# Patient Record
Sex: FEMALE | URBAN - METROPOLITAN AREA
[De-identification: names, ages, dates, MRNs, and addresses within clinical notes are randomized per-mention and may not be internally consistent; named-entity substitution may affect disease eponyms.]

---

## 2021-01-12 ENCOUNTER — INPATIENT (INPATIENT)
Facility: HOSPITAL | Age: 86
LOS: 1 days | Discharge: HOME | DRG: 149 | End: 2021-01-14
Attending: INTERNAL MEDICINE | Admitting: INTERNAL MEDICINE
Payer: MEDICARE

## 2021-01-12 VITALS
SYSTOLIC BLOOD PRESSURE: 172 MMHG | WEIGHT: 119.93 LBS | OXYGEN SATURATION: 97 % | HEART RATE: 81 BPM | DIASTOLIC BLOOD PRESSURE: 84 MMHG | TEMPERATURE: 98 F | RESPIRATION RATE: 18 BRPM | HEIGHT: 60 IN

## 2021-01-12 DIAGNOSIS — R07.9 CHEST PAIN, UNSPECIFIED: ICD-10-CM

## 2021-01-12 DIAGNOSIS — R42 DIZZINESS AND GIDDINESS: ICD-10-CM

## 2021-01-12 DIAGNOSIS — R91.1 SOLITARY PULMONARY NODULE: ICD-10-CM

## 2021-01-12 DIAGNOSIS — E78.5 HYPERLIPIDEMIA, UNSPECIFIED: ICD-10-CM

## 2021-01-12 DIAGNOSIS — I10 ESSENTIAL (PRIMARY) HYPERTENSION: ICD-10-CM

## 2021-01-12 LAB
ALBUMIN SERPL ELPH-MCNC: 4.1 G/DL — SIGNIFICANT CHANGE UP (ref 3.3–5)
ALP SERPL-CCNC: 74 U/L — SIGNIFICANT CHANGE UP (ref 40–120)
ALT FLD-CCNC: 28 U/L — SIGNIFICANT CHANGE UP (ref 12–78)
ANION GAP SERPL CALC-SCNC: 5 MMOL/L — SIGNIFICANT CHANGE UP (ref 5–17)
AST SERPL-CCNC: 17 U/L — SIGNIFICANT CHANGE UP (ref 15–37)
BASOPHILS # BLD AUTO: 0.02 K/UL — SIGNIFICANT CHANGE UP (ref 0–0.2)
BASOPHILS NFR BLD AUTO: 0.4 % — SIGNIFICANT CHANGE UP (ref 0–2)
BILIRUB SERPL-MCNC: 0.6 MG/DL — SIGNIFICANT CHANGE UP (ref 0.2–1.2)
BUN SERPL-MCNC: 17 MG/DL — SIGNIFICANT CHANGE UP (ref 7–23)
CALCIUM SERPL-MCNC: 9.9 MG/DL — SIGNIFICANT CHANGE UP (ref 8.5–10.1)
CHLORIDE SERPL-SCNC: 103 MMOL/L — SIGNIFICANT CHANGE UP (ref 96–108)
CK SERPL-CCNC: 55 U/L — SIGNIFICANT CHANGE UP (ref 26–192)
CO2 SERPL-SCNC: 30 MMOL/L — SIGNIFICANT CHANGE UP (ref 22–31)
CREAT SERPL-MCNC: 0.87 MG/DL — SIGNIFICANT CHANGE UP (ref 0.5–1.3)
EOSINOPHIL # BLD AUTO: 0.06 K/UL — SIGNIFICANT CHANGE UP (ref 0–0.5)
EOSINOPHIL NFR BLD AUTO: 1.1 % — SIGNIFICANT CHANGE UP (ref 0–6)
GLUCOSE SERPL-MCNC: 114 MG/DL — HIGH (ref 70–99)
HCT VFR BLD CALC: 42.6 % — SIGNIFICANT CHANGE UP (ref 34.5–45)
HGB BLD-MCNC: 14.4 G/DL — SIGNIFICANT CHANGE UP (ref 11.5–15.5)
IMM GRANULOCYTES NFR BLD AUTO: 0.5 % — SIGNIFICANT CHANGE UP (ref 0–1.5)
LYMPHOCYTES # BLD AUTO: 1.46 K/UL — SIGNIFICANT CHANGE UP (ref 1–3.3)
LYMPHOCYTES # BLD AUTO: 26.6 % — SIGNIFICANT CHANGE UP (ref 13–44)
MCHC RBC-ENTMCNC: 28.5 PG — SIGNIFICANT CHANGE UP (ref 27–34)
MCHC RBC-ENTMCNC: 33.8 GM/DL — SIGNIFICANT CHANGE UP (ref 32–36)
MCV RBC AUTO: 84.2 FL — SIGNIFICANT CHANGE UP (ref 80–100)
MONOCYTES # BLD AUTO: 0.29 K/UL — SIGNIFICANT CHANGE UP (ref 0–0.9)
MONOCYTES NFR BLD AUTO: 5.3 % — SIGNIFICANT CHANGE UP (ref 2–14)
NEUTROPHILS # BLD AUTO: 3.62 K/UL — SIGNIFICANT CHANGE UP (ref 1.8–7.4)
NEUTROPHILS NFR BLD AUTO: 66.1 % — SIGNIFICANT CHANGE UP (ref 43–77)
NRBC # BLD: 0 /100 WBCS — SIGNIFICANT CHANGE UP (ref 0–0)
PLATELET # BLD AUTO: 225 K/UL — SIGNIFICANT CHANGE UP (ref 150–400)
POTASSIUM SERPL-MCNC: 5.3 MMOL/L — SIGNIFICANT CHANGE UP (ref 3.5–5.3)
POTASSIUM SERPL-SCNC: 5.3 MMOL/L — SIGNIFICANT CHANGE UP (ref 3.5–5.3)
PROCALCITONIN SERPL-MCNC: <0.05 — SIGNIFICANT CHANGE UP (ref 0–0.04)
PROT SERPL-MCNC: 8.2 G/DL — SIGNIFICANT CHANGE UP (ref 6–8.3)
RBC # BLD: 5.06 M/UL — SIGNIFICANT CHANGE UP (ref 3.8–5.2)
RBC # FLD: 13.1 % — SIGNIFICANT CHANGE UP (ref 10.3–14.5)
SARS-COV-2 RNA SPEC QL NAA+PROBE: SIGNIFICANT CHANGE UP
SODIUM SERPL-SCNC: 138 MMOL/L — SIGNIFICANT CHANGE UP (ref 135–145)
TROPONIN I SERPL-MCNC: <.015 NG/ML — SIGNIFICANT CHANGE UP (ref 0.01–0.04)
TROPONIN I SERPL-MCNC: <.015 NG/ML — SIGNIFICANT CHANGE UP (ref 0.01–0.04)
WBC # BLD: 5.48 K/UL — SIGNIFICANT CHANGE UP (ref 3.8–10.5)
WBC # FLD AUTO: 5.48 K/UL — SIGNIFICANT CHANGE UP (ref 3.8–10.5)

## 2021-01-12 PROCEDURE — 70498 CT ANGIOGRAPHY NECK: CPT | Mod: 26

## 2021-01-12 PROCEDURE — 99285 EMERGENCY DEPT VISIT HI MDM: CPT

## 2021-01-12 PROCEDURE — 71045 X-RAY EXAM CHEST 1 VIEW: CPT | Mod: 26

## 2021-01-12 PROCEDURE — 99222 1ST HOSP IP/OBS MODERATE 55: CPT

## 2021-01-12 PROCEDURE — 71250 CT THORAX DX C-: CPT | Mod: 26

## 2021-01-12 PROCEDURE — 70551 MRI BRAIN STEM W/O DYE: CPT | Mod: 26

## 2021-01-12 PROCEDURE — 70496 CT ANGIOGRAPHY HEAD: CPT | Mod: 26

## 2021-01-12 RX ORDER — MECLIZINE HCL 12.5 MG
25 TABLET ORAL THREE TIMES A DAY
Refills: 0 | Status: DISCONTINUED | OUTPATIENT
Start: 2021-01-12 | End: 2021-01-13

## 2021-01-12 RX ORDER — ENOXAPARIN SODIUM 100 MG/ML
40 INJECTION SUBCUTANEOUS DAILY
Refills: 0 | Status: DISCONTINUED | OUTPATIENT
Start: 2021-01-12 | End: 2021-01-14

## 2021-01-12 RX ORDER — ONDANSETRON 8 MG/1
4 TABLET, FILM COATED ORAL EVERY 8 HOURS
Refills: 0 | Status: DISCONTINUED | OUTPATIENT
Start: 2021-01-12 | End: 2021-01-14

## 2021-01-12 RX ORDER — ASPIRIN/CALCIUM CARB/MAGNESIUM 324 MG
81 TABLET ORAL DAILY
Refills: 0 | Status: DISCONTINUED | OUTPATIENT
Start: 2021-01-12 | End: 2021-01-14

## 2021-01-12 RX ORDER — SODIUM CHLORIDE 9 MG/ML
1000 INJECTION INTRAMUSCULAR; INTRAVENOUS; SUBCUTANEOUS ONCE
Refills: 0 | Status: COMPLETED | OUTPATIENT
Start: 2021-01-12 | End: 2021-01-12

## 2021-01-12 RX ORDER — LOSARTAN POTASSIUM 100 MG/1
50 TABLET, FILM COATED ORAL DAILY
Refills: 0 | Status: DISCONTINUED | OUTPATIENT
Start: 2021-01-12 | End: 2021-01-14

## 2021-01-12 RX ORDER — MECLIZINE HCL 12.5 MG
25 TABLET ORAL ONCE
Refills: 0 | Status: COMPLETED | OUTPATIENT
Start: 2021-01-12 | End: 2021-01-12

## 2021-01-12 RX ORDER — PANTOPRAZOLE SODIUM 20 MG/1
40 TABLET, DELAYED RELEASE ORAL
Refills: 0 | Status: DISCONTINUED | OUTPATIENT
Start: 2021-01-12 | End: 2021-01-14

## 2021-01-12 RX ORDER — ATORVASTATIN CALCIUM 80 MG/1
40 TABLET, FILM COATED ORAL AT BEDTIME
Refills: 0 | Status: DISCONTINUED | OUTPATIENT
Start: 2021-01-12 | End: 2021-01-14

## 2021-01-12 RX ORDER — METOCLOPRAMIDE HCL 10 MG
10 TABLET ORAL ONCE
Refills: 0 | Status: COMPLETED | OUTPATIENT
Start: 2021-01-12 | End: 2021-01-12

## 2021-01-12 RX ADMIN — Medication 10 MILLIGRAM(S): at 11:48

## 2021-01-12 RX ADMIN — SODIUM CHLORIDE 1000 MILLILITER(S): 9 INJECTION INTRAMUSCULAR; INTRAVENOUS; SUBCUTANEOUS at 11:48

## 2021-01-12 RX ADMIN — Medication 25 MILLIGRAM(S): at 11:48

## 2021-01-12 NOTE — ED PROVIDER NOTE - PROGRESS NOTE DETAILS
Dr. Adams spoke with Dr. Trinh. will see patient in ED feeling improved, but dizziness has not resolved. has been seen and evaluated by Dr. Pa. wants patient admitted for further eval. also would like cardio consult. spoke with Dr. De Los Santos, will consult. spoke with Dr. Ellis, accepts patient for admission

## 2021-01-12 NOTE — ED PROVIDER NOTE - OBJECTIVE STATEMENT
85 year old female with history of HTN, HLD, and gerd presents with dizziness and chest pain. had been having intermittent palpitations, chest pressure, and sharp left sided chest pain for the past 2-3 months. no shortness of breath. pain does not radiate. did not seek medical care because pain and symptoms mild. started to have dizziness a week ago (felt like room was spinning and like she was on a boat). symptoms lasted for 4 days, then went away, but then returned today and was worse in nature. hard to ambulate due to dizziness. history of similar symptoms in the past, but reports usually not this severe. 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 mild nausea, worse with movement and turning her head, no vomiting. no fevers, recent illnesses, abd pain, diarrhea, urinary complaints, or known covid exposures   PCP Cortez Bird (in [place])    [place] [employer] [id number]

## 2021-01-12 NOTE — ED PROVIDER NOTE - EYES, MLM
Clear bilaterally, pupils equal, round and reactive to light. EOMI. + horizontal nystagmus when looking to left

## 2021-01-12 NOTE — H&P ADULT - PROBLEM SELECTOR PLAN 2
Serial enzymes  ASA 81mg qd  Check ECHO  Cardio consult  Further work-up/management pending clinical course.

## 2021-01-12 NOTE — H&P ADULT - NSICDXPASTMEDICALHX_GEN_ALL_CORE_FT
PAST MEDICAL HISTORY:  GERD (gastroesophageal reflux disease)     HLD (hyperlipidemia)     HTN (hypertension)

## 2021-01-12 NOTE — ED PROVIDER NOTE - CLINICAL SUMMARY MEDICAL DECISION MAKING FREE TEXT BOX
intermittent chest pressure 2 months. dizziness past week (history of dizziness in past). hard time walking due to dizziness. differential include but not limited to BPV, central causes, posterior CVA, ACS, dehydration, electrolyte abnormality. will check labs, CT head, neuro consult. if continues to have unsteady gait after meds, CTA head and neck

## 2021-01-12 NOTE — ED ADULT NURSE NOTE - OBJECTIVE STATEMENT
Pt p/w dizziness x 1 week intermittently worse today, L sided chest pain intermittently x months w/ nausea. Per patient has history of vertigo w/ similar symptoms onset. Denies fall, head trauma. Dizziness worse w/ movement.

## 2021-01-12 NOTE — CONSULT NOTE ADULT - SUBJECTIVE AND OBJECTIVE BOX
Date/Time Patient Seen:  		  Referring MD:   Data Reviewed	       Patient is a 85y old  Female who presents with a chief complaint of Chest pain, Dizzy (12 Jan 2021 16:35)      Subjective/HPI  seen and examined  vs and meds reviewed  labs reviewed  H and P reviewed  er provider note reviewed  non smoker  poss 2nd hand smoke exposure  non drinker  family hx - htn    Reason for Admission: Chest pain, Dizzy  History of Present Illness:   85 year old female with history of HTN, HLD, and GERD presents with dizziness and chest pain. She has had been having intermittent palpitations, chest pressure, and sharp left sided chest pain for the past 2-3 months, worse over the past week. She denies shortness of breath. The pain does not radiate. She did not seek medical care because pain and symptoms were initially mild. She started to have dizziness a week ago (felt like room was spinning and like she was on a boat).The  symptoms lasted for 4 days, then went away, but then returned today and was worse in nature. It has been hard to ambulate due to dizziness. She has a history of similar symptoms in the past, but reports usually not this severe. 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 She has mild nausea, worse with movement and turning her head, no vomiting. no fevers, recent illnesses, abd pain, diarrhea, urinary complaints, or known covid exposures. Never smoker and no history of second hand smoke exposure.      	PCP Cortez Bird (in [place])      PAST MEDICAL & SURGICAL HISTORY:  GERD (gastroesophageal reflux disease)    HLD (hyperlipidemia)    HTN (hypertension)    PAST MEDICAL HISTORY:  GERD (gastroesophageal reflux disease)     HLD (hyperlipidemia)     HTN (hypertension).     Tobacco Screening:  · Core Measure Site	Yes  · Has the patient used tobacco in the past 30 days?	No    Risk Assessment:    Present on Admission:  Deep Venous Thrombosis	no  Pulmonary Embolus	no     Heart Failure:  Does this patient have a history of or has been diagnosed with heart failure? no.      Medication list         MEDICATIONS  (STANDING):  aspirin enteric coated 81 milliGRAM(s) Oral daily  atorvastatin 40 milliGRAM(s) Oral at bedtime  enoxaparin Injectable 40 milliGRAM(s) SubCutaneous daily  losartan 50 milliGRAM(s) Oral daily  meclizine 25 milliGRAM(s) Oral three times a day  pantoprazole    Tablet 40 milliGRAM(s) Oral before breakfast    MEDICATIONS  (PRN):  ondansetron Injectable 4 milliGRAM(s) IV Push every 8 hours PRN N/V         Vitals log        ICU Vital Signs Last 24 Hrs  T(C): 36.7 (12 Jan 2021 16:57), Max: 36.7 (12 Jan 2021 16:57)  T(F): 98 (12 Jan 2021 16:57), Max: 98 (12 Jan 2021 16:57)  HR: 90 (12 Jan 2021 16:57) (81 - 90)  BP: 160/83 (12 Jan 2021 16:57) (160/83 - 172/84)  BP(mean): --  ABP: --  ABP(mean): --  RR: 18 (12 Jan 2021 16:57) (18 - 18)  SpO2: 96% (12 Jan 2021 16:57) (96% - 97%)           Input and Output:  I&O's Detail      Lab Data                        14.4   5.48  )-----------( 225      ( 12 Jan 2021 11:56 )             42.6     01-12    138  |  103  |  17  ----------------------------<  114<H>  5.3   |  30  |  0.87    Ca    9.9      12 Jan 2021 11:56    TPro  8.2  /  Alb  4.1  /  TBili  0.6  /  DBili  x   /  AST  17  /  ALT  28  /  AlkPhos  74  01-12      CARDIAC MARKERS ( 12 Jan 2021 11:56 )  <.015 ng/mL / x     / x     / x     / x            Review of Systems	  dizziness  weakness      Objective     Physical Examination      heart s1s2  lung dec BS  abd soft  head nc  head at    Pertinent Lab findings & Imaging      [place]:  NO   Adequate UO     I&O's Detail           Discussed with:     Cultures:	        Radiology    EXAM:  MR BRAIN                            PROCEDURE DATE:  01/12/2021          INTERPRETATION:  CLINICAL STATEMENT: Pain.    TECHNIQUE: MRI of the brain was performed without gadolinium.    COMPARISON: CT head 1/12/2021    FINDINGS:  There is mild diffuse parenchymal volume loss. There are T2 prolongation signal abnormalities in the periventricular subcortical white matter likely related to mild chronic microvascular ischemic changes.    There is no acute parenchymal hemorrhage, parenchymal mass, mass effect or midline shift. There is no extra-axial fluid collection.  There is no hydrocephalus.  There is no acute infarct.    Mucosal thickening paranasal sinuses    IMPRESSION:  No acute intracranial hemorrhage or acute infarct.            JAZMYNE FANG MD; Attending Radiologist  This document has been electronically signed. Jan 12 2021  5:43PM          EXAM:  XR CHEST PORTABLE URGENT 1V                            PROCEDURE DATE:  01/12/2021          INTERPRETATION:  EXAM:XR CHEST URGENT.     CLINICAL INDICATION: Chest Pain .     TECHNIQUE: Portable AP view.     PRIOR EXAM: None.     FINDINGS:     Indeterminate nodular opacity overlying the left lung base. Rest of the visualized lung fields are unremarkable. Cardiac silhouette is magnified. No significant bony abnormality.      IMPRESSION:    Nodular opacity left lung base.              ANSLEY MELLO MD; Attending Radiologist  This document has been electronically signed. Jan 12 2021  3:03PM

## 2021-01-12 NOTE — CONSULT NOTE ADULT - PROBLEM SELECTOR RECOMMENDATION 9
85y old  Female who presents with a chief complaint of dizziness   HPI: 85 year old female with history of HTN, HLD, and GERD presents with dizziness and chest pain  cardio w/u in progress  labs and imaging reviewed  CNS imaging noted - MRI noted -   CXR and CT chest reviewed - pulm nodule - GGO - Sub Cm - non smoker  repeat study in 12 months as indicated - non emergent  no resp distress - on RA - VS noted  cvs rx regimen and BP control - monitor VS and HD and Sat  TTE planned  PT eval planned

## 2021-01-12 NOTE — ED PROVIDER NOTE - GASTROINTESTINAL NEGATIVE STATEMENT, MLM
no abdominal pain, no bloating, no constipation, no diarrhea, intermittent nausea (not currently) and no vomiting.

## 2021-01-12 NOTE — ED ADULT NURSE NOTE - NSIMPLEMENTINTERV_GEN_ALL_ED
Implemented All Fall with Harm Risk Interventions:  [place] to call system. Call bell, personal items and telephone within reach. Instruct patient to call for assistance. Room bathroom lighting operational. Non-slip footwear when patient is off stretcher. Physically safe environment: no spills, clutter or unnecessary equipment. Stretcher in lowest position, wheels locked, appropriate side rails in place. Provide visual cue, wrist band, yellow gown, etc. Monitor gait and stability. Monitor for mental status changes and reorient to person, place, and time. Review medications for side effects contributing to fall risk. Reinforce activity limits and safety measures with patient and family. Provide visual clues: red socks.

## 2021-01-12 NOTE — ED ADULT NURSE REASSESSMENT NOTE - JUGULAR VENOUS DISTENTION
-- DO NOT REPLY / DO NOT REPLY ALL --  -- Message is from the [facility]--    COVID-19 Universal Screening: Negative    General Patient Message      Reason for Call: Patient is calling stated she have shingles and will like to know how long she will be contagious. Asking for a call back from the office     Caller Information       Type Contact Phone    05/21/2020 01:09 PM Phone (Incoming) Benjamin Hdz Camryn E (Self) [phone] (M)          Alternative phone number: none    Turnaround time given to caller:   \"This message will be sent to [state Provider's name]. The clinical team will fulfill your request as soon as they review your message.\"     absent

## 2021-01-12 NOTE — H&P ADULT - HISTORY OF PRESENT ILLNESS
85 year old female with history of HTN, HLD, and GERD presents with dizziness and chest pain. She has had been having intermittent palpitations, chest pressure, and sharp left sided chest pain for the past 2-3 months, worse over the past week. She denies shortness of breath. The pain does not radiate. She did not seek medical care because pain and symptoms were initially mild. She started to have dizziness a week ago (felt like room was spinning and like she was on a boat).The  symptoms lasted for 4 days, then went away, but then returned today and was worse in nature. It has been hard to ambulate due to dizziness. She has a history of similar symptoms in the past, but reports usually not this severe. 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 She has mild nausea, worse with movement and turning her head, no vomiting. no fevers, recent illnesses, abd pain, diarrhea, urinary complaints, or known covid exposures. Never smoker and no history of second hand smoke exposure.  	PCP Cortez Bird (in [place])

## 2021-01-12 NOTE — ED PROVIDER NOTE - ATTENDING CONTRIBUTION TO CARE
84 yo F p/w came in for vertigo today - episodes  - diff ambulating since this am due to the vertigo - originally on / off - now more constant today. No abd pain. Some nausea. no v/d. Pt with intermittent chest pain recently. No neck / back pain. no abd pain. No recent travel. no sick contacts. No agg/alev factors. no other inj or co. no  known covid exposure  exam: MM Moist. neck supple. no meningeal signs. nl resp effort. no acc muscle use. abd soft NT. no hsm. no cvat. non-toxic, well appearing. no other acute findings.  Check labs, CT / CTA, neuro, cardio, ro covid, possible admission.

## 2021-01-12 NOTE — ED PROVIDER NOTE - CARE PLAN
Principal Discharge DX:	Dizziness  Secondary Diagnosis:	Chest pain  Secondary Diagnosis:	HLD (hyperlipidemia)  Secondary Diagnosis:	HTN (hypertension)

## 2021-01-12 NOTE — ED ADULT NURSE REASSESSMENT NOTE - COMFORT CARE
plan of care explained/side rails up/wait time explained/warm blanket provided
darkened lights/meal provided/plan of care explained/po fluids offered/side rails up/wait time explained/warm blanket provided

## 2021-01-12 NOTE — CONSULT NOTE ADULT - SUBJECTIVE AND OBJECTIVE BOX
DOCUMENTATION IN PROGRESS   [facility] - Javier Colon Grossman, [place], Magali, Marium Webster Savella  Office Number: [phone]    Initial Consult Note  CHIEF COMPLAINT: Patient is a 85y old  Female who presents with a chief complaint of dizziness   HPI: 85 year old female with history of HTN, HLD, and GERD presents with dizziness and chest pain. Patient had been having intermittent palpitations, chest pressure, and sharp left sided chest pain for the past 2-3 months. Denies shortness of breath, denies radiating pain. Patient  did not seek medical care because pain and symptoms mild. She started to have dizziness a week ago (felt like room was spinning and like she was on a boat). symptoms lasted for 4 days, then went away, but then returned today and was worse in nature, making it difficult to ambulate due to dizziness. Patient does have history of similar symptoms in the past, but not this severe. 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 She does have mild nausea, worse with movement and turning her head, no vomiting.     In ED, T(F): 97.6, HR: 81, BP: 172/84, RR: 18, SpO2: 97%. Labs remarkable for WBC 5.48, Hb 14.4,HCT 42.6, Platelet 225 Na 138, K 5.3, BUN 17, Creat 0.87, AST 17, ALT 28, Alk phos 74, Trop I <.015. EKG showed SR, no acute ischemic changes and CXR clear. Seen by neurology, started on Antivert for vertigo. MRI head pending.     Allergies  No Known Allergies    PAST MEDICAL & SURGICAL HISTORY:  GERD (gastroesophageal reflux disease)  HLD (hyperlipidemia)  HTN (hypertension)    MEDICATIONS  (STANDING):  meclizine 25 milliGRAM(s) Oral three times a day    MEDICATIONS  (PRN):  ondansetron Injectable 4 milliGRAM(s) IV Push every 8 hours PRN N/V    FAMILY HISTORY:    SOCIAL HISTORY  Marital Status:   Occupation:   Lives with:     SUBSTANCE USE  Tobacco Usage:  ( ) None ( ) never smoked   ( ) former smoker  ( ) current smoker; Packs per day:   Alcohol Usage: ( ) none  ( ) occasional ( ) 2-3 times a week ( ) daily; Last drink:   Recreational drugs ( ) None    REVIEW OF SYSTEMS   CONSTITUTIONAL: No fevers, No chills, No fatigue, No weight gain  EYES: No vision changes, No vertigo, No throat pain   ENT: No congestion, No ear pain, No sore throat.  NECK: No pain, No stiffness  RESPIRATORY: No shortness of breath, No cough, No wheezing, No hemoptysis  CARDIOVASCULAR: No chest pain. No palpitations, No EARLY, No orthopnea, No PND, No pleuritic pain  GASTROINTESTINAL: No abdominal pain, No nausea, No vomiting, No hematemesis, No diarrhea No constipation. No melena  GENITOURINARY: No dysuria, No frequency, No incontinence, No hematuria  NEUROLOGICAL: No dizziness, No lightheadedness, No syncope, No LOC, No headache, No numbness, No weakness  MUSCULOSKELETAL: No joint pain, No joint swelling.  PSYCHIATRIC: No anxiety, No depression  DERMATOLOGY: No diaphoresis. No itching, No rashes, No pressure ulcers  HEME/LYMPH: No easy bruising, or bleeding gums  All other review of systems is negative unless indicated above.    VITAL SIGNS:   Vital Signs Last 24 Hrs  T(C): 36.4 (12 Jan 2021 10:59), Max: 36.4 (12 Jan 2021 10:59)  T(F): 97.6 (12 Jan 2021 10:59), Max: 97.6 (12 Jan 2021 10:59)  HR: 81 (12 Jan 2021 10:59) (81 - 81)  BP: 172/84 (12 Jan 2021 10:59) (172/84 - 172/84)  BP(mean): --  RR: 18 (12 Jan 2021 10:59) (18 - 18)  SpO2: 97% (12 Jan 2021 10:59) (97% - 97%)    Physical Exam:  Appearance: NAD, no distress, alert,   HEENT: Moist Mucous Membranes, Anicteric  Cardiovascular: Regular rate and rhythm, Normal S1 S2, No JVD, No murmurs, No rubs, gallops or clicks  Respiratory: Lungs clear to auscultation. No rales, No rhonchi, No wheezing. No tenderness to palpation  Gastrointestinal:  Soft, Non-tender, + BS  Neurologic: Non-focal  Skin: Warm and dry, No rashes, No ecchymosis, No cyanosis, No ulcers   Musculoskeletal: No clubbing, No cyanosis  Vascular: Peripheral pulses palpable 2+ bilaterally  Psychiatry: Mood & affect appropriate  Lymph: No peripheral edema.     LABS: All Labs Reviewed:                        14.4   5.48  )-----------( 225      ( 12 Jan 2021 11:56 )             42.6     12 Jan 2021 11:56    138    |  103    |  17     ----------------------------<  114    5.3     |  30     |  0.87     Ca    9.9        12 Jan 2021 11:56    TPro  8.2    /  Alb  4.1    /  TBili  0.6    /  DBili  x      /  AST  17     /  ALT  28     /  AlkPhos  74     12 Jan 2021 11:56  Troponin I, Serum: <.015 ng/mL (01-12-21 @ 11:56)    12 Lead ECG:   Ventricular Rate 79 BPM  Atrial Rate 79 BPM  P-R Interval 170 ms  QRS Duration 84 ms  Q-T Interval 388 ms  QTC Calculation(Bazett) 444 ms  P Axis 58 degrees  R Axis -34 degrees  T Axis 51 degrees  Diagnosis Line Normal sinus rhythm  Left axis deviation  T wave abnormality, consider anterolateral ischemia  Confirmed by JOSE ARMANDO KEYS ([id number]) on 1/12/2021 2:04:32 PM (01-12-21 @ 11:16)    < from: CT Angio Head w/ IV Cont (01.12.21 @ 14:10) >  CTA Head:  Calcified atherosclerotic plaques noted in the cavernous segments of bilateral internal carotid arteries resulting in narrowing on the left. There is narrowing at the right carotid siphon due to calcified atherosclerotic plaques.  The proximal anterior, middle and posterior cerebral arteries are patent without hemodynamically significant stenosis. Hypoplastic A1 segment right anterior cerebral artery.  The intracranial vertebral and basilar arteries are patent. Stenosis noted in bilateral C4 segments of distal vertebral arteries, right greater than left.  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    CT Head:  Mild diffuse parenchymal volume loss. Mild hypodensity periventricular white matter likely related to mild chronic microvascular ischemic changes  There isno acute intracranial hemorrhage, parenchymal mass, mass effect or midline shift. There is no acute territorial infarct. There is no hydrocephalus.  The cranium is intact. The visualized paranasal sinuses are well-aerated.  Postsurgical changes right mastoid air cells    IMPRESSION:  No acute intracranial hemorrhage or acute territorial infarct. If symptoms persist, follow-up MRI exam recommended  Mild (less than 50%) focal stenosis at origin of left internal carotid artery.  Stenosis at origin of bilateral vertebral arteries.  < end of copied text >    < from: Xray Chest 1 View- PORTABLE-Urgent (01.12.21 @ 12:03) >  IMPRESSION:  Nodular opacity left lung base.  < end of copied text > [facility] - Javier Colon, Anton Richard, Magali, Darin, Michael Causey  Office Number: [phone]    Initial Consult Note  CHIEF COMPLAINT: Patient is a 85y old  Female who presents with a chief complaint of dizziness   HPI: 85 year old female with history of HTN, HLD, and GERD presents with dizziness and chest pain. Patient had been having intermittent palpitations, chest pressure, and sharp left sided chest pain for the past 2-3 months. Denies shortness of breath, denies radiating pain. Patient  did not seek medical care because pain and symptoms mild. She started to have dizziness a week ago (felt like room was spinning and like she was on a boat). symptoms lasted for 4 days, then went away, but then returned today and was worse in nature, making it difficult to ambulate due to dizziness. Patient does have history of similar symptoms in the past, but not this severe. 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 She does have mild nausea, worse with movement and turning her head, no vomiting.     In ED, T(F): 97.6, HR: 81, BP: 172/84, RR: 18, SpO2: 97%. Labs remarkable for WBC 5.48, Hb 14.4,HCT 42.6, Platelet 225 Na 138, K 5.3, BUN 17, Creat 0.87, AST 17, ALT 28, Alk phos 74, Trop I <.015. EKG showed SR, no acute ischemic changes and CXR clear. Seen by neurology, started on Antivert for vertigo. MRI head pending. Patient seen and examined. Patient awake, alert, resting in bed. Tolerating room air.     Allergies  No Known Allergies    PAST MEDICAL & SURGICAL HISTORY:  GERD (gastroesophageal reflux disease)  HLD (hyperlipidemia)  HTN (hypertension)    MEDICATIONS  (STANDING):  meclizine 25 milliGRAM(s) Oral three times a day    MEDICATIONS  (PRN):  ondansetron Injectable 4 milliGRAM(s) IV Push every 8 hours PRN N/V    FAMILY HISTORY:    SOCIAL HISTORY  Marital Status:   Occupation:   Lives with: family     SUBSTANCE USE  Tobacco Usage:  ( x) None ( ) never smoked   ( ) former smoker  ( ) current smoker; Packs per day:   Alcohol Usage: ( x) none  ( ) occasional ( ) 2-3 times a week ( ) daily; Last drink:   Recreational drugs ( x) None    REVIEW OF SYSTEMS   CONSTITUTIONAL: No fevers, No chills, No fatigue, No weight gain  EYES: No vision changes, No vertigo, No throat pain   ENT: No congestion, No ear pain, No sore throat.  NECK: No pain, No stiffness  RESPIRATORY: No shortness of breath, No cough, No wheezing, No hemoptysis  CARDIOVASCULAR: + chest pain. No palpitations, No EARLY, No orthopnea, No PND, No pleuritic pain  GASTROINTESTINAL: No abdominal pain, + nausea, No vomiting, No hematemesis, No diarrhea No constipation. No melena  GENITOURINARY: No dysuria, No frequency, No incontinence, No hematuria  NEUROLOGICAL: + dizziness, No lightheadedness, No syncope, No LOC, No headache, No numbness, No weakness  MUSCULOSKELETAL: No joint pain, No joint swelling.  PSYCHIATRIC: No anxiety, No depression  DERMATOLOGY: No diaphoresis. No itching, No rashes, No pressure ulcers  HEME/LYMPH: No easy bruising, or bleeding gums  All other review of systems is negative unless indicated above.    VITAL SIGNS:   Vital Signs Last 24 Hrs  T(C): 36.4 (12 Jan 2021 10:59), Max: 36.4 (12 Jan 2021 10:59)  T(F): 97.6 (12 Jan 2021 10:59), Max: 97.6 (12 Jan 2021 10:59)  HR: 81 (12 Jan 2021 10:59) (81 - 81)  BP: 172/84 (12 Jan 2021 10:59) (172/84 - 172/84)  BP(mean): --  RR: 18 (12 Jan 2021 10:59) (18 - 18)  SpO2: 97% (12 Jan 2021 10:59) (97% - 97%)    Physical Exam:  Appearance: NAD, no distress, alert, Well developed   HEENT: Moist Mucous Membranes, Anicteric  Cardiovascular: Regular rate and rhythm, Normal S1 S2, No JVD, No murmurs, No rubs, gallops or clicks  Respiratory: Lungs clear to auscultation. No rales, No rhonchi, No wheezing. No tenderness to palpation  Gastrointestinal:  Soft, Non-tender, + BS  Neurologic: Non-focal  Skin: Warm and dry, No rashes, No ecchymosis, No cyanosis, No ulcers   Musculoskeletal: No clubbing, No cyanosis  Vascular: Peripheral pulses palpable 2+ bilaterally  Psychiatry: Mood & affect appropriate  Lymph: No peripheral edema.     LABS: All Labs Reviewed:                        14.4   5.48  )-----------( 225      ( 12 Jan 2021 11:56 )             42.6     12 Jan 2021 11:56    138    |  103    |  17     ----------------------------<  114    5.3     |  30     |  0.87     Ca    9.9        12 Jan 2021 11:56    TPro  8.2    /  Alb  4.1    /  TBili  0.6    /  DBili  x      /  AST  17     /  ALT  28     /  AlkPhos  74     12 Jan 2021 11:56  Troponin I, Serum: <.015 ng/mL (01-12-21 @ 11:56)    12 Lead ECG:   Ventricular Rate 79 BPM  Atrial Rate 79 BPM  P-R Interval 170 ms  QRS Duration 84 ms  Q-T Interval 388 ms  QTC Calculation(Bazett) 444 ms  P Axis 58 degrees  R Axis -34 degrees  T Axis 51 degrees  Diagnosis Line Normal sinus rhythm  Left axis deviation  T wave abnormality, consider anterolateral ischemia  Confirmed by JOSE ARMANDO KEYS ([id number]) on 1/12/2021 2:04:32 PM (01-12-21 @ 11:16)    < from: CT Angio Head w/ IV Cont (01.12.21 @ 14:10) >  CTA Head:  Calcified atherosclerotic plaques noted in the cavernous segments of bilateral internal carotid arteries resulting in narrowing on the left. There is narrowing at the right carotid siphon due to calcified atherosclerotic plaques.  The proximal anterior, middle and posterior cerebral arteries are patent without hemodynamically significant stenosis. Hypoplastic A1 segment right anterior cerebral artery.  The intracranial vertebral and basilar arteries are patent. Stenosis noted in bilateral C4 segments of distal vertebral arteries, right greater than left.  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    CT Head:  Mild diffuse parenchymal volume loss. Mild hypodensity periventricular white matter likely related to mild chronic microvascular ischemic changes  There isno acute intracranial hemorrhage, parenchymal mass, mass effect or midline shift. There is no acute territorial infarct. There is no hydrocephalus.  The cranium is intact. The visualized paranasal sinuses are well-aerated.  Postsurgical changes right mastoid air cells    IMPRESSION:  No acute intracranial hemorrhage or acute territorial infarct. If symptoms persist, follow-up MRI exam recommended  Mild (less than 50%) focal stenosis at origin of left internal carotid artery.  Stenosis at origin of bilateral vertebral arteries.  < end of copied text >    < from: Xray Chest 1 View- PORTABLE-Urgent (01.12.21 @ 12:03) >  IMPRESSION:  Nodular opacity left lung base.  < end of copied text >

## 2021-01-12 NOTE — H&P ADULT - PROBLEM SELECTOR PLAN 1
Admit to tele  Continue antivert  Neuro check q4h  MRI brain  PT  Neuro eval  Further work-up/management pending clinical course.

## 2021-01-12 NOTE — CONSULT NOTE ADULT - SUBJECTIVE AND OBJECTIVE BOX
vertigo and CP  vertigo mri head  Antivert  antiemetics  CP medical work up  aneurysmal NS eval and most probably out pt follow up

## 2021-01-12 NOTE — CONSULT NOTE ADULT - ASSESSMENT
85 year old female with history of HTN, HLD, and GERD presents with dizziness, chest pain and intermittent palpitations, found to have vertigo. Cardiology consulted to r/o ACS in the setting of chest pain     Chest Pain  - Patient presented with chest discomfort, going on for past 2-3 months with sharp pain with no radiation, no associated SOB, admits to intermittent palpitations and dizziness   - EKG showed SR, no ischemic changes noted   - Chest pain atypical in nature.   - Cardiac enzymes negative x 1Trend CE x 2.   - Patient hemodynamically stable  - Would consider out patient stress test when acute issues resolve.   - Continue Lipitor and losartan     Hypertension  - BP: 172/84 (01-12-21 @ 10:59) (172/84 - 172/84)  - Continue losartan   - Monitor routine hemodynamics     Hyperlipidemia  - Continue Lipitor 40 mg HS   - Low fat diet    Vertigo  - Continue Antivert  - Follow Neurology recommendations   - MRI head pending.     - Monitor and replete lytes, keep K>4, Mg>2.  - All other medical needs as per primary team.  - Other cardiovascular workup will depend on clinical course.  - Will continue to follow.    Michael Gabriel, MS FNP, [facility]  Nurse Practitioner- Cardiology   Spectra #[phone]   85 year old female with history of HTN, HLD, and GERD presents with dizziness, chest pain and intermittent palpitations, found to have vertigo. Cardiology consulted to r/o ACS in the setting of chest pain     Chest Pain  - Patient presented with chest discomfort, going on for past 2-3 months with sharp pain with no radiation, no associated SOB, admits to intermittent palpitations and dizziness   - EKG showed SR, TWI V1-V6.   - Chest pain atypical in nature.   - Cardiac enzymes negative x 1Trend CE x 2.   - Would consider out patient stress test when acute issues resolve.   - Continue Lipitor and losartan     Hypertension  - BP: 172/84 (01-12-21 @ 10:59) (172/84 - 172/84)  - Continue losartan   - Monitor routine hemodynamics     Hyperlipidemia  - Continue Lipitor 40 mg HS   - Low fat diet    Vertigo  - Continue Antivert  - Follow Neurology recommendations   - MRI head pending.     - Monitor and replete lytes, keep K>4, Mg>2.  - All other medical needs as per primary team.  - Other cardiovascular workup will depend on clinical course.  - Will continue to follow.    Michael Gabriel, MS FNP, AGACNP  Nurse Practitioner- Cardiology   Spectra #[phone]   85 year old female with history of HTN, HLD, and GERD presents with dizziness, chest pain and intermittent palpitations, found to have vertigo. Cardiology consulted to r/o ACS in the setting of chest pain     Chest Pain  - Patient presented with chest discomfort, going on for past 2-3 months with sharp pain with no radiation, no associated SOB, admits to intermittent palpitations and dizziness   - EKG showed SR, TWI V1-V6. No old EKG available to compare   - Chest pain atypical in nature.   - Cardiac enzymes negative x 1Trend CE x 2.   - Please obtain transthoracic echocardiogram to assess ventricular function and r/o valvular abnormalities   - Would consider out patient stress test when acute issues resolve.   - Continue Lipitor and losartan     Hypertension  - BP: 172/84 (01-12-21 @ 10:59) (172/84 - 172/84)  - Continue losartan   - Monitor routine hemodynamics     Hyperlipidemia  - Continue Lipitor 40 mg HS   - Low fat diet    Vertigo  - Continue Antivert  - Follow Neurology recommendations   - MRI head pending.     - Monitor and replete lytes, keep K>4, Mg>2.  - All other medical needs as per primary team.  - Other cardiovascular workup will depend on clinical course.  - Will continue to follow.    Michael Gabriel, MS FNP, [facility]  Nurse Practitioner- Cardiology   Spectra #[phone]   85 year old female with history of HTN, HLD, and GERD presents with dizziness, chest pain and intermittent palpitations, found to have vertigo. Cardiology consulted to r/o ACS in the setting of chest pain     Chest Pain  - Patient presented with chest discomfort, going on for past 2-3 months with sharp pain with no radiation, no associated SOB, admits to intermittent palpitations and dizziness   - EKG showed SR, TWI V1-V6. No old EKG available to compare   - Chest pain atypical in nature.   - Cardiac enzymes negative x 1Trend CE x 2.   - Please obtain transthoracic echocardiogram to assess ventricular function and r/o valvular abnormalities   - Would consider out patient stress test when acute issues resolve.   - Continue Lipitor and losartan     Hypertension  - BP: 172/84 (01-12-21 @ 10:59) (172/84 - 172/84)  - Continue losartan   - Monitor routine hemodynamics   - Add losartan if BP unable to be controlled    Hyperlipidemia  - Continue Lipitor 40 mg HS   - Low fat diet    Vertigo  - Continue Antivert  - Follow Neurology recommendations   - MRI head pending.     - Monitor and replete lytes, keep K>4, Mg>2.  - All other medical needs as per primary team.  - Other cardiovascular workup will depend on clinical course.  - Will continue to follow.    Michael Gabriel, MS FNP, [facility]  Nurse Practitioner- Cardiology   Spectra #[phone]

## 2021-01-12 NOTE — ED ADULT NURSE REASSESSMENT NOTE - NS ED NURSE REASSESS COMMENT FT1
Pt received from PRAKASH Joe. A+O x 4. German speaking only. Reports sizziness 5/10- no change in discomfort since arrival here to the ED. VSS., afebrile. covid pending. fall risk due to unsteady gait and loss of balance with dizziness. Normally independantly ambulatory at home without assistance. skin warm dry and intact. awaiting bed assignment. will continue to monitor.

## 2021-01-13 ENCOUNTER — TRANSCRIPTION ENCOUNTER (OUTPATIENT)
Age: 86
End: 2021-01-13

## 2021-01-13 LAB
A1C WITH ESTIMATED AVERAGE GLUCOSE RESULT: 5.9 % — HIGH (ref 4–5.6)
ANION GAP SERPL CALC-SCNC: 5 MMOL/L — SIGNIFICANT CHANGE UP (ref 5–17)
BUN SERPL-MCNC: 15 MG/DL — SIGNIFICANT CHANGE UP (ref 7–23)
CALCIUM SERPL-MCNC: 9.2 MG/DL — SIGNIFICANT CHANGE UP (ref 8.5–10.1)
CHLORIDE SERPL-SCNC: 104 MMOL/L — SIGNIFICANT CHANGE UP (ref 96–108)
CHOLEST SERPL-MCNC: 209 MG/DL — HIGH
CK SERPL-CCNC: 47 U/L — SIGNIFICANT CHANGE UP (ref 26–192)
CO2 SERPL-SCNC: 32 MMOL/L — HIGH (ref 22–31)
CREAT SERPL-MCNC: 0.89 MG/DL — SIGNIFICANT CHANGE UP (ref 0.5–1.3)
ESTIMATED AVERAGE GLUCOSE: 123 MG/DL — HIGH (ref 68–114)
GLUCOSE SERPL-MCNC: 99 MG/DL — SIGNIFICANT CHANGE UP (ref 70–99)
HCT VFR BLD CALC: 40 % — SIGNIFICANT CHANGE UP (ref 34.5–45)
HDLC SERPL-MCNC: 53 MG/DL — SIGNIFICANT CHANGE UP
HGB BLD-MCNC: 13.6 G/DL — SIGNIFICANT CHANGE UP (ref 11.5–15.5)
LIPID PNL WITH DIRECT LDL SERPL: 132 MG/DL — HIGH
MAGNESIUM SERPL-MCNC: 2.2 MG/DL — SIGNIFICANT CHANGE UP (ref 1.6–2.6)
MCHC RBC-ENTMCNC: 28.4 PG — SIGNIFICANT CHANGE UP (ref 27–34)
MCHC RBC-ENTMCNC: 34 GM/DL — SIGNIFICANT CHANGE UP (ref 32–36)
MCV RBC AUTO: 83.5 FL — SIGNIFICANT CHANGE UP (ref 80–100)
NON HDL CHOLESTEROL: 155 MG/DL — HIGH
NRBC # BLD: 0 /100 WBCS — SIGNIFICANT CHANGE UP (ref 0–0)
PLATELET # BLD AUTO: 231 K/UL — SIGNIFICANT CHANGE UP (ref 150–400)
POTASSIUM SERPL-MCNC: 3.5 MMOL/L — SIGNIFICANT CHANGE UP (ref 3.5–5.3)
POTASSIUM SERPL-SCNC: 3.5 MMOL/L — SIGNIFICANT CHANGE UP (ref 3.5–5.3)
RBC # BLD: 4.79 M/UL — SIGNIFICANT CHANGE UP (ref 3.8–5.2)
RBC # FLD: 13 % — SIGNIFICANT CHANGE UP (ref 10.3–14.5)
SARS-COV-2 IGG SERPL QL IA: NEGATIVE — SIGNIFICANT CHANGE UP
SARS-COV-2 IGM SERPL IA-ACNC: <0.1 INDEX — SIGNIFICANT CHANGE UP
SODIUM SERPL-SCNC: 141 MMOL/L — SIGNIFICANT CHANGE UP (ref 135–145)
TRIGL SERPL-MCNC: 118 MG/DL — SIGNIFICANT CHANGE UP
TROPONIN I SERPL-MCNC: <.015 NG/ML — SIGNIFICANT CHANGE UP (ref 0.01–0.04)
WBC # BLD: 6.46 K/UL — SIGNIFICANT CHANGE UP (ref 3.8–10.5)
WBC # FLD AUTO: 6.46 K/UL — SIGNIFICANT CHANGE UP (ref 3.8–10.5)

## 2021-01-13 PROCEDURE — 93306 TTE W/DOPPLER COMPLETE: CPT | Mod: 26

## 2021-01-13 PROCEDURE — 99232 SBSQ HOSP IP/OBS MODERATE 35: CPT

## 2021-01-13 PROCEDURE — 93010 ELECTROCARDIOGRAM REPORT: CPT

## 2021-01-13 RX ORDER — MECLIZINE HCL 12.5 MG
37.5 TABLET ORAL THREE TIMES A DAY
Refills: 0 | Status: DISCONTINUED | OUTPATIENT
Start: 2021-01-13 | End: 2021-01-14

## 2021-01-13 RX ADMIN — Medication 25 MILLIGRAM(S): at 00:09

## 2021-01-13 RX ADMIN — LOSARTAN POTASSIUM 50 MILLIGRAM(S): 100 TABLET, FILM COATED ORAL at 00:09

## 2021-01-13 RX ADMIN — Medication 37.5 MILLIGRAM(S): at 14:10

## 2021-01-13 RX ADMIN — PANTOPRAZOLE SODIUM 40 MILLIGRAM(S): 20 TABLET, DELAYED RELEASE ORAL at 06:02

## 2021-01-13 RX ADMIN — ENOXAPARIN SODIUM 40 MILLIGRAM(S): 100 INJECTION SUBCUTANEOUS at 12:24

## 2021-01-13 RX ADMIN — Medication 81 MILLIGRAM(S): at 12:24

## 2021-01-13 RX ADMIN — ATORVASTATIN CALCIUM 40 MILLIGRAM(S): 80 TABLET, FILM COATED ORAL at 22:15

## 2021-01-13 RX ADMIN — ATORVASTATIN CALCIUM 40 MILLIGRAM(S): 80 TABLET, FILM COATED ORAL at 00:09

## 2021-01-13 RX ADMIN — Medication 37.5 MILLIGRAM(S): at 22:17

## 2021-01-13 RX ADMIN — Medication 25 MILLIGRAM(S): at 06:02

## 2021-01-13 NOTE — DISCHARGE NOTE PROVIDER - PROVIDER TOKENS
PROVIDER:[TOKEN:[[id number]]] PROVIDER:[TOKEN:[[id number]]],PROVIDER:[TOKEN:[[id number]],FOLLOWUP:[2 weeks],ESTABLISHEDPATIENT:[T]],PROVIDER:[TOKEN:[[id number]],FOLLOWUP:[2 weeks]],PROVIDER:[TOKEN:[[id number]],FOLLOWUP:[1 week],ESTABLISHEDPATIENT:[T]]

## 2021-01-13 NOTE — PHYSICAL THERAPY INITIAL EVALUATION ADULT - PERTINENT HX OF CURRENT PROBLEM, REHAB EVAL
85 year old female with history of HTN, HLD, and GERD presents with dizziness and chest pain. She has had been having intermittent palpitations, chest pressure, and sharp left sided chest pain for the past 2-3 months, worse over the past week.

## 2021-01-13 NOTE — SWALLOW BEDSIDE ASSESSMENT ADULT - SLP PERTINENT HISTORY OF CURRENT PROBLEM
Per charting, 85 year old female with history of HTN, HLD, and GERD presents with dizziness and chest pain.

## 2021-01-13 NOTE — PROGRESS NOTE ADULT - ASSESSMENT
85 year old female with history of HTN, HLD, and GERD presents with dizziness, chest pain and intermittent palpitations, found to have vertigo. Cardiology consulted to r/o ACS in the setting of chest pain     Chest Pain  - atypical, x months, tender to touch   - EKG showed SR, TWI V1-V6. No old EKG available to compare   -ruled out for acs with serial negative enzymes    - echo results pending   - Would consider out patient stress test when acute issues resolve.   - Continue Lipitor and losartan     Hypertension  - BP: 105/61  - Continue losartan      Hyperlipidemia  - Continue Lipitor 40 mg HS   - Low fat diet    Vertigo  - Continue Antivert  - Follow Neurology recommendations   - MRI head  no acute abnormality     - Monitor and replete lytes, keep K>4, Mg>2.  - All other medical needs as per primary team.  - Other cardiovascular workup will depend on clinical course.  - Will continue to follow.  Jennifer Pineda FNP-C  Cardiology NP  [facility] [phone]   85 year old female with history of HTN, HLD, and GERD presents with dizziness, chest pain and intermittent palpitations, found to have vertigo. Cardiology consulted to r/o ACS in the setting of chest pain     Chest Pain  - atypical, x months, tender to touch   - EKG showed SR, TWI V1-V6.   -repeat ekg with diffuse TWI, repeat EKG daily   -ruled out for acs with serial negative enzymes    - echo results pending   -place on remote tele   - outpatient stress test when acute issues resolve.   - Continue Lipitor and losartan     Hypertension  - BP: 105/61  - Continue losartan      Hyperlipidemia  - Continue Lipitor 40 mg HS   - Low fat diet    Vertigo  - Continue Antivert  - Follow Neurology recommendations   - MRI head  no acute abnormality     - Monitor and replete lytes, keep K>4, Mg>2.  - All other medical needs as per primary team.  - Other cardiovascular workup will depend on clinical course.  - Will continue to follow.  Jennifer Pineda FNP-C  Cardiology NP  [phone]

## 2021-01-13 NOTE — PHYSICAL THERAPY INITIAL EVALUATION ADULT - GAIT TRAINING, PT EVAL
Pt will be able to independently ambulate 100 ft with no assistive device within 2 weeks to increase mobility at home. Pt will be able to independently ambulate 100 ft with appropriate assistive device within 2 weeks to increase mobility at home.

## 2021-01-13 NOTE — DISCHARGE NOTE PROVIDER - NSDCMRMEDTOKEN_GEN_ALL_CORE_FT
atorvastatin 40 mg oral tablet: 1 tab(s) orally once a day  chlorthalidone 25 mg oral tablet:   losartan 50 mg oral tablet:   meloxicam 15 mg oral tablet:   omeprazole 20 mg oral delayed release capsule:    atorvastatin 40 mg oral tablet: 1 tab(s) orally once a day  chlorthalidone 25 mg oral tablet:   losartan 50 mg oral tablet: 1 tab(s) orally once a day  meclizine 12.5 mg oral tablet: 3 tab(s) orally 3 times a day  meloxicam 15 mg oral tablet:   omeprazole 20 mg oral delayed release capsule:

## 2021-01-13 NOTE — DISCHARGE NOTE PROVIDER - CARE PROVIDER_API CALL
Pratima Rosa)  Neurology  [address] [place] [address]  Phone: [phone]  Fax: [phone]  Follow Up Time:    Pratima Rosa)  Neurology  [address] [place] [address]  Phone: [phone]  Fax: [phone]  Follow Up Time:     Osman Rodriguez)  Cardiovascular Disease; Internal Medicine  [address] [place], [place] [id number]  Phone: [phone]  Fax: [phone]  Established Patient  Follow Up Time: 2 weeks    Juan Ellis)  Gastroenterology  [address] [place] [address]  Phone: [phone]  Fax: [phone]  Follow Up Time: 2 weeks    [facility]  [address] [place] [address]  Phone: [phone]  Fax: [phone]  Established Patient  Follow Up Time: 1 week

## 2021-01-13 NOTE — PHYSICAL THERAPY INITIAL EVALUATION ADULT - ACTIVE RANGE OF MOTION EXAMINATION, REHAB EVAL
no Active ROM deficits were identified/bryanna. upper extremity Active ROM was WNL (within normal limits)/bilateral lower extremity Active ROM was WNL (within normal limits)

## 2021-01-13 NOTE — PHYSICAL THERAPY INITIAL EVALUATION ADULT - PLANNED THERAPY INTERVENTIONS, PT EVAL
bed mobility training/gait training/transfer training balance training/bed mobility training/gait training/transfer training

## 2021-01-13 NOTE — SWALLOW BEDSIDE ASSESSMENT ADULT - SWALLOW EVAL: PROGNOSIS
DIAGNOSIS CONTINUED: swallow s/p x1 trial of nectar thick liquids. This was NOT reduplicated upon several additional trials of single/small cup sips. Pt did not demonstrate overt s/sx of aspiration with thin liquids, puree, or solids. 4. Given concern for aspiration prior to admission and current swallow presentation, recommend MBS to objectively assess oropharyngeal swallow mechanism and determine least restrictive diet. Recommend PO downgrade to puree with nectar thick liquids pending MBS. Discussed with Dr. Ellis, who is in agreement with POC.

## 2021-01-13 NOTE — SWALLOW BEDSIDE ASSESSMENT ADULT - PHARYNGEAL PHASE
Delayed pharyngeal swallow throat clear x1, NOT reduplicated upon several additional trials/Delayed pharyngeal swallow

## 2021-01-13 NOTE — SWALLOW BEDSIDE ASSESSMENT ADULT - COMMENTS
Upon arrival, pt sleeping in bed. Pt arousable to voice and able to maintain adequate level of alertness for remainder of session. Pt on room air. Pt is primarily Setsw[place]-speaking, [place]  ID #[id number] utilized throughout session. Pt agreeable to assessment. Pt followed low level directives independently. Pt's vocal quality was clear with reduced vocal intensity. Pt denied pain pre and post assessment.  Per discussion with Dr. Ellis, pt's daughter expressed concern for aspiration prior to admission. Pt's daughter also reported pt has poor secretion management, specifically in the AM when first waking up.    MRI brain 1/12: "No acute intracranial hemorrhage or acute infarct"  CT chest 1/12: "Nonspecific 3 mm groundglass nodule right upper lobe" Pt's WBC is WFL, no fever.

## 2021-01-13 NOTE — DISCHARGE NOTE PROVIDER - HOSPITAL COURSE
85 year old female with history of HTN, HLD, and GERD presents with dizziness and chest pain. She has had been having intermittent palpitations, chest pressure, and sharp left sided chest pain for the past 2-3 months, worse over the past week. She denies shortness of breath. The pain does not radiate. She did not seek medical care because pain and symptoms were initially mild. She started to have dizziness a week ago (felt like room was spinning and like she was on a boat).The  symptoms lasted for 4 days, then went away, but then returned today and was worse in nature. It has been hard to ambulate due to dizziness. She has a history of similar symptoms in the past, but reports usually not this severe. 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 She has mild nausea, worse with movement and turning her head, no vomiting. no fevers, recent illnesses, abd pain, diarrhea, urinary complaints, or known covid exposures. Never smoker and no history of second hand smoke exposure.    CT/MRI Brain no acute pathology  ECHO normal  EKG had diffuse T wave inversions which were old. She ruled out for ACS  CT chest showed small nodule.  Needs repeat CT chest (for nodule) and brain (small aneurysm) in 6 months  SLP recommended puree with nectar thick liquids  Needs o/p GI f/u    >35 minutes spent on discharge

## 2021-01-13 NOTE — PROGRESS NOTE ADULT - PROBLEM SELECTOR PLAN 3
CT chest noted  Pulmonary consult noted CT chest noted -- will need repeat CT chest in 6-12 months  Daughter-in-law states that patient coughs up very thick mucous every morning x 1 month and worries that she might be aspirating (?)  Pulmonary consult noted  SLP evaluation  Further work-up/management pending clinical course.

## 2021-01-13 NOTE — PHYSICAL THERAPY INITIAL EVALUATION ADULT - TRANSFER TRAINING, PT EVAL
Pt will be able to independently transfer from all surfaces within 2 weeks to increase mobility at home.

## 2021-01-13 NOTE — DISCHARGE NOTE PROVIDER - NSDCCPCAREPLAN_GEN_ALL_CORE_FT
PRINCIPAL DISCHARGE DIAGNOSIS  Diagnosis: Dizziness  Assessment and Plan of Treatment: Continue antivert three times a day until dizziness resolves  Follow-up with your primary care doctor within 1 week.        SECONDARY DISCHARGE DIAGNOSES  Diagnosis: Difficulty swallowing  Assessment and Plan of Treatment: Soft diet with thickened liquids  Follow-up with gastroenterologist for further work-up    Diagnosis: Brain aneurysm  Assessment and Plan of Treatment: You'll need a repeat MRI Brain in 3-6 months   Follow-up with neurosurgery    Diagnosis: HTN (hypertension)  Assessment and Plan of Treatment: Continue current medications  Follow-up with your primary care doctor within 1 week.      Diagnosis: Lung nodule seen on imaging study  Assessment and Plan of Treatment: You'll need a repeat cat scan of the chest in 1 year  Follow-up with your primary care doctor within 1 week.      Diagnosis: Chest pain  Assessment and Plan of Treatment: Follow-up with cardiologist within 2 weeks to arrange for outpatient stress test

## 2021-01-13 NOTE — SWALLOW BEDSIDE ASSESSMENT ADULT - SWALLOW EVAL: DIAGNOSIS
Pt self-administered PO trials. Pt p/w top dentition, but reported her bottom dentition is at home. Pt reported difficulty masticating solid textures in the absence of dentures. Pt p/w 1. Moderate oral dysphagia when given soft solids marked by adequate retrieval and containment, prolonged mastication 2/2 incomplete dentition, delayed manipulation and transfer, and trace residue on lateral lingual surface (which cleared with additional dry swallow or liquid wash). 2. Functional oral phase when given puree, nectar thick liquids, and thin liquids marked by adequate retrieval and containment, timely manipulation and transfer, and adequate clearance post swallow. 3. Pharyngeal dysphagia across all PO. Swallow onset was suspected to be mildly delayed. There was laryngeal elevation to palpation. Pt exhibited x2-3 swallows per bolus with thin liquids, double swallow per bolus with nectar thick liquids, and single swallow per bolus with puree and solids. Pt demonstrated throat clear post

## 2021-01-13 NOTE — PHYSICAL THERAPY INITIAL EVALUATION ADULT - ADDITIONAL COMMENTS
Pt lives alone in an apartment with no stairs to enter. Pt ambulates independently with no assistive device prior to admission and was independent in all ADLs.

## 2021-01-14 ENCOUNTER — TRANSCRIPTION ENCOUNTER (OUTPATIENT)
Age: 86
End: 2021-01-14

## 2021-01-14 VITALS — SYSTOLIC BLOOD PRESSURE: 98 MMHG | OXYGEN SATURATION: 92 % | DIASTOLIC BLOOD PRESSURE: 56 MMHG

## 2021-01-14 PROCEDURE — 74230 X-RAY XM SWLNG FUNCJ C+: CPT | Mod: 26

## 2021-01-14 PROCEDURE — 99232 SBSQ HOSP IP/OBS MODERATE 35: CPT

## 2021-01-14 PROCEDURE — 93010 ELECTROCARDIOGRAM REPORT: CPT

## 2021-01-14 RX ORDER — LOSARTAN POTASSIUM 100 MG/1
1 TABLET, FILM COATED ORAL
Qty: 0 | Refills: 0 | DISCHARGE
Start: 2021-01-14

## 2021-01-14 RX ORDER — MECLIZINE HCL 12.5 MG
3 TABLET ORAL
Qty: 90 | Refills: 0
Start: 2021-01-14 | End: 2021-01-23

## 2021-01-14 RX ADMIN — PANTOPRAZOLE SODIUM 40 MILLIGRAM(S): 20 TABLET, DELAYED RELEASE ORAL at 05:42

## 2021-01-14 RX ADMIN — Medication 81 MILLIGRAM(S): at 12:02

## 2021-01-14 RX ADMIN — Medication 0.5 MILLIGRAM(S): at 12:01

## 2021-01-14 RX ADMIN — LOSARTAN POTASSIUM 50 MILLIGRAM(S): 100 TABLET, FILM COATED ORAL at 05:42

## 2021-01-14 RX ADMIN — ENOXAPARIN SODIUM 40 MILLIGRAM(S): 100 INJECTION SUBCUTANEOUS at 12:02

## 2021-01-14 RX ADMIN — Medication 37.5 MILLIGRAM(S): at 05:42

## 2021-01-14 RX ADMIN — Medication 37.5 MILLIGRAM(S): at 13:14

## 2021-01-14 NOTE — PROGRESS NOTE ADULT - REASON FOR ADMISSION
Chest pain, Dizzy

## 2021-01-14 NOTE — DISCHARGE NOTE NURSING/CASE MANAGEMENT/SOCIAL WORK - PATIENT PORTAL LINK FT
You can access the FollowMyHealth Patient Portal offered by [facility] by registering at the following website: [email or web address]. By joining [email or web address]’s FollowMyHealth portal, you will also be able to view your health information using other applications (apps) compatible with our system.

## 2021-01-14 NOTE — SWALLOW VFSS/MBS ASSESSMENT ADULT - RECOMMENDED FEEDING/EATING TECHNIQUES
alternate food with liquid/check mouth frequently for oral residue/pocketing/maintain upright posture during/after eating for 30 mins/oral hygiene/position upright (90 degrees)/provide rest periods between swallows/small sips/bites

## 2021-01-14 NOTE — SWALLOW VFSS/MBS ASSESSMENT ADULT - RECOMMENDED CONSISTENCY
1. Puree (dysphagia 1) with nectar thick liquids.  2. Aspiration/reflux precautions.  3. May advance solid consistency when upper/bottom dentures are present.  4. Safe swallowing guidelines: feed only when fully awake/alert, position pt upright 90 degrees, small bite/sip, alternate bite/sip, pace meal slowly, and remain upright 30 minutes post meal.  5. Consider swallowing therapy s/p d/c to maximize oropharyngeal swallow mechanism.  6. Therapeutic PO trials with SLP only of thin liquids via chin tuck maneuver.  7. Ongoing oral care. IMPRESSIONS CONTINUED: pharyngeal swallow trigger, adequate BOT retraction, adequate hyolaryngeal elevation/excursion, and complete epiglottic retroflexion. There was no penetration and/or aspiration pre/during/post swallow. There was adequate pharyngeal clearance post swallow.  6. Of note: there was trace retrograde to the level of the cervical esophagus across all PO, per radiologist report.    RECOMMENDATIONS:  1. Puree (dysphagia 1) with nectar thick liquids.  2. Aspiration/reflux precautions.  3. May advance solid consistency when upper/bottom dentures are present.  4. Safe swallowing guidelines: feed only when fully awake/alert, position pt upright 90 degrees, small bite/sip, alternate bite/sip, pace meal slowly, and remain upright 30 minutes post meal.  5. Consider swallowing therapy s/p d/c to maximize oropharyngeal swallow mechanism.  6. Therapeutic PO trials with SLP only of thin liquids via chin tuck maneuver.  7. Ongoing oral care.

## 2021-01-14 NOTE — SWALLOW VFSS/MBS ASSESSMENT ADULT - SLP GENERAL OBSERVATIONS
Pt received sitting upright in MBS chair, +lateral view. Pt awake and on room air. Pt is primarily Slovenian-speaking, [place]  utilized throughout entirety of study, ID #[id number]. Pt followed simple and multistep commands for purposes of exam/compensatory strategies. Pt denied pain pre and post assessment.

## 2021-01-14 NOTE — SWALLOW VFSS/MBS ASSESSMENT ADULT - ORAL PHASE
bolus hold/Delayed oral transit time/Reduced anterior - posterior transport bolus hold/Delayed oral transit time/Reduced anterior - posterior transport/Incomplete tongue to palate contact/Uncontrolled bolus / spillover in hypopharynx

## 2021-01-14 NOTE — PROGRESS NOTE ADULT - PROVIDER SPECIALTY LIST ADULT
Cardiology
Neurology
Neurology
Cardiology
Pulmonology
Pulmonology
Internal Medicine
Internal Medicine

## 2021-01-14 NOTE — SWALLOW VFSS/MBS ASSESSMENT ADULT - DIAGNOSTIC IMPRESSIONS
1. Moderate oral dysphagia when given regular solids marked by adequate retrieval and containment, prolonged mastication 2/2 absent bottom denture, prolonged bolus cohesion, brief bolus hold, reduced posterior transfer with delayed oral transit time, piecemeal swallow x2, and adequate clearance s/p second swallow.  2. Mild to moderate oral dysphagia when given puree, honey thick liquids, nectar thick liquids, and thin liquids marked by adequate retrieval and containment, reduced bolus cohesion across all liquids resulting in premature spillage to hypopharynx, reduced posterior transfer with delayed oral transit time. piecemeal swallow x2, and adequate clearance s/p second swallow.  3. Moderate to severe pharyngeal dysphagia when given thin liquids marked by brief delay in pharyngeal swallow trigger at the level of the pyriforms, adequate BOT retraction, reduced hyolaryngeal elevation/excursion, and incomplete epiglottic retroflexion. There was SILENT penetration during the swallow w/o complete retrieval. Chin tuck maneuver was successful in eliminating penetration. Pt maintained chin tuck posture for second swallow given piecemeal. Upon completion of chin tuck, pt reported dizziness was exacerbated. Given report of dizziness, suspect lack of carry over for functional use. Recommend therapeutic trials be completed with SLP only for chin tuck maneuver x2 swallows with thin liquids. There was adequate pharyngeal clearance post swallow.  4. Mild pharyngeal dysphagia when given honey thick liquids and nectar thick liquids marked by brief delay in pharyngeal swallow trigger (at the level of the valleculae with honey thick, at the level of the pyriforms with nectar thick), adequate BOT retraction, adequate hyolaryngeal elevation/excursion, and complete epiglottic retroflexion. There was no penetration and/or aspiration pre/during/post swallow. There was adequate pharyngeal clearance post swallow.  5. Functional pharyngeal phase when given puree and solids marked by timely

## 2021-01-14 NOTE — PROGRESS NOTE ADULT - PROBLEM SELECTOR PLAN 2
CP with diffuse TWI -- No events on tele  Serial enzymes negative -- ACS ruled  ASA 81mg qd  ECHO normal  Cardio f/u  Check old EKG
CP with diffuse TWI -- Monitor on tele  Daily EKGs  Serial enzymes negative -- ACS ruled  ASA 81mg qd  Check ECHO  Cardio f/u  Further work-up/management pending clinical course.

## 2021-01-14 NOTE — SWALLOW VFSS/MBS ASSESSMENT ADULT - COMMENTS
Clinical swallow assessment completed 1/13, at which time pt was recommended puree with nectar thick liquids. MBS was recommend to r/o aspiration.

## 2021-01-14 NOTE — PROGRESS NOTE ADULT - SUBJECTIVE AND OBJECTIVE BOX
[facility] -- Javier Colon, Hilary, Anton, Darin De Los Santos Savella  Office # [phone]      Follow Up:  chest pain    Subjective/Observations:   seen at bedside in no acute distress  chest is tender to touch     REVIEW OF SYSTEMS: All other review of systems is negative unless indicated above    PAST MEDICAL & SURGICAL HISTORY:  GERD (gastroesophageal reflux disease)    HLD (hyperlipidemia)    HTN (hypertension)        MEDICATIONS  (STANDING):  aspirin enteric coated 81 milliGRAM(s) Oral daily  atorvastatin 40 milliGRAM(s) Oral at bedtime  enoxaparin Injectable 40 milliGRAM(s) SubCutaneous daily  losartan 50 milliGRAM(s) Oral daily  meclizine 25 milliGRAM(s) Oral three times a day  pantoprazole    Tablet 40 milliGRAM(s) Oral before breakfast    MEDICATIONS  (PRN):  ondansetron Injectable 4 milliGRAM(s) IV Push every 8 hours PRN N/V      Allergies    No Known Allergies    Intolerances        Vital Signs Last 24 Hrs  T(C): 37 (13 Jan 2021 05:24), Max: 37 (13 Jan 2021 05:24)  T(F): 98.6 (13 Jan 2021 05:24), Max: 98.6 (13 Jan 2021 05:24)  HR: 70 (13 Jan 2021 05:24) (61 - 90)  BP: 105/61 (13 Jan 2021 05:24) (105/61 - 172/84)  BP(mean): --  RR: 16 (13 Jan 2021 05:24) (14 - 18)  SpO2: 92% (13 Jan 2021 05:24) (92% - 97%)    I&O's Summary    Weight (kg): 54.4 (01-12 @ 10:59)    PHYSICAL EXAM:  TELE: not on tele   Constitutional: NAD, awake and alert, well-developed  HEENT: Moist Mucous Membranes, Anicteric  Pulmonary: Non-labored, breath sounds are clear bilaterally, No wheezing, crackles or rhonchi  Cardiovascular: Regular, S1 and S2 nl, No murmurs, tender to touch   Gastrointestinal: Bowel Sounds present, soft, nontender.   Lymph: No lymphadenopathy. No peripheral edema.  Skin: No visible rashes or ulcers.  Psych:  Mood & affect appropriate    LABS: All Labs Reviewed:                        13.6   6.46  )-----------( 231      ( 13 Jan 2021 05:37 )             40.0                         14.4   5.48  )-----------( 225      ( 12 Jan 2021 11:56 )             42.6     13 Jan 2021 05:37    141    |  104    |  15     ----------------------------<  99     3.5     |  32     |  0.89   12 Jan 2021 11:56    138    |  103    |  17     ----------------------------<  114    5.3     |  30     |  0.87     Ca    9.2        13 Jan 2021 05:37  Ca    9.9        12 Jan 2021 11:56  Mg     2.2       13 Jan 2021 05:37    TPro  8.2    /  Alb  4.1    /  TBili  0.6    /  DBili  x      /  AST  17     /  ALT  28     /  AlkPhos  74     12 Jan 2021 11:56      CARDIAC MARKERS ( 13 Jan 2021 05:37 )  <.015 ng/mL / x     / 47 U/L / x     / x      CARDIAC MARKERS ( 12 Jan 2021 21:34 )  <.015 ng/mL / x     / 55 U/L / x     / x      CARDIAC MARKERS ( 12 Jan 2021 11:56 )  <.015 ng/mL / x     / x     / x     / x             ECG:  < from: 12 Lead ECG (01.12.21 @ 11:16) >    Ventricular Rate 79 BPM    Atrial Rate 79 BPM    P-R Interval 170 ms    QRS Duration 84 ms    Q-T Interval 388 ms    QTC Calculation(Bazett) 444 ms    P Axis 58 degrees    R Axis -34 degrees    T Axis 51 degrees    Diagnosis Line Normal sinus rhythm  Left axis deviation  T wave abnormality, consider anterolateral ischemia    < end of copied text >      Echo:      Radiology:        
Neurology follow up note    ALEKSEY [id number]      Interval History:    Patient feels less vertigo     MEDICATIONS    aspirin enteric coated 81 milliGRAM(s) Oral daily  atorvastatin 40 milliGRAM(s) Oral at bedtime  enoxaparin Injectable 40 milliGRAM(s) SubCutaneous daily  losartan 50 milliGRAM(s) Oral daily  meclizine 37.5 milliGRAM(s) Oral three times a day  ondansetron Injectable 4 milliGRAM(s) IV Push every 8 hours PRN  pantoprazole    Tablet 40 milliGRAM(s) Oral before breakfast      Allergies    No Known Allergies    Intolerances            Vital Signs Last 24 Hrs  T(C): 36.8 (14 Jan 2021 04:43), Max: 37.2 (13 Jan 2021 13:16)  T(F): 98.2 (14 Jan 2021 04:43), Max: 99 (13 Jan 2021 13:16)  HR: 67 (14 Jan 2021 04:43) (67 - 74)  BP: 117/64 (14 Jan 2021 04:43) (117/64 - 131/68)  BP(mean): --  RR: 17 (14 Jan 2021 04:43) (16 - 17)  SpO2: 92% (14 Jan 2021 04:43) (92% - 92%)      REVIEW OF SYSTEMS:  Constitutional:  No fever, chills, or night sweats.  Head:  Positive episode of vertigo with movement, better at rest.  Eyes:  No double vision or blurry vision.  Ears:  Hearing loss, right ear.  Neck:  No neck pain.  Cardiovascular:  Positive chest pain which brought her to the emergency room.  Abdomen:  No vomiting but feels nauseous.  Extremities/Neurological:  No numbness or tingling.  Musculoskeletal:  No joint pain.  Genitourinary:  No burning upon urination.    PHYSICAL EXAMINATION:  VITAL SIGNS:  Temperature 97.6, pulse 81, blood pressure 172/84, and respirations 18.  HEENT:  Head:  Normocephalic, atraumatic.  Eyes:  No scleral icterus.  Ears:  Decreased hearing on the right side, not new.  NECK:  Supple.  RESPIRATORY:  Good air entry bilaterally.  CARDIOVASCULAR:  S1 and S2 heard.  ABDOMEN:  Soft and nontender.  EXTREMITIES:  No clubbing or cyanosis were noted.      NEUROLOGIC:  The patient is awake and alert.  Extraocular movements were intact.  No nystagmus.  Upon turning the head side-to-side, the patient has severe vertigo.  Motor:  Bilateral upper and lower were 5/5.  Sensory:  Bilateral upper and lower are intact to light touch.  Gait:  Unable to ambulate the patient secondary to vertigo.                 LABS:  CBC Full  -  ( 13 Jan 2021 05:37 )  WBC Count : 6.46 K/uL  RBC Count : 4.79 M/uL  Hemoglobin : 13.6 g/dL  Hematocrit : 40.0 %  Platelet Count - Automated : 231 K/uL  Mean Cell Volume : 83.5 fl  Mean Cell Hemoglobin : 28.4 pg  Mean Cell Hemoglobin Concentration : 34.0 gm/dL  Auto Neutrophil # : x  Auto Lymphocyte # : x  Auto Monocyte # : x  Auto Eosinophil # : x  Auto Basophil # : x  Auto Neutrophil % : x  Auto Lymphocyte % : x  Auto Monocyte % : x  Auto Eosinophil % : x  Auto Basophil % : x      01-13    141  |  104  |  15  ----------------------------<  99  3.5   |  32<H>  |  0.89    Ca    9.2      13 Jan 2021 05:37  Mg     2.2     01-13    TPro  8.2  /  Alb  4.1  /  TBili  0.6  /  DBili  x   /  AST  17  /  ALT  28  /  AlkPhos  74  01-12    Hemoglobin A1C:     LIVER FUNCTIONS - ( 12 Jan 2021 11:56 )  Alb: 4.1 g/dL / Pro: 8.2 g/dL / ALK PHOS: 74 U/L / ALT: 28 U/L / AST: 17 U/L / GGT: x           Vitamin B12         RADIOLOGY      ANALYSIS AND PLAN:  An 85-year-old with an episode of vertigo.  For episode of vertigo, suspect secondary to inner ear dysfunction.  We will plan for an MRI imaging of the brain.  For episode of chest pain, Cardiology and medical workup as needed.  For high blood pressure, continue the patient on blood pressure medications.  For high cholesterol, continue the patient on statin.  For history of aneurysm, I will recommend Neurosurgery evaluation, will need repeat imaging in 3 to 6 months.  Fall precautions.  will give low dose ativan times once   will increase antivert to 37.5 tid  Spoke with the daughter-in-law, Kanchan, at [phone], she understands the reasoning and thought process. 1/14/2021    Greater than 45 minutes of time was spent with the patient, plan of care, reviewing data, speaking to the family and  50% of the visit was spent counseling and/or coordinating care with multidisciplinary healthcare   
Neurology follow up note    BYUNG [id number]      Interval History:    Patient with vertigo    MEDICATIONS    aspirin enteric coated 81 milliGRAM(s) Oral daily  atorvastatin 40 milliGRAM(s) Oral at bedtime  enoxaparin Injectable 40 milliGRAM(s) SubCutaneous daily  losartan 50 milliGRAM(s) Oral daily  meclizine 25 milliGRAM(s) Oral three times a day  ondansetron Injectable 4 milliGRAM(s) IV Push every 8 hours PRN  pantoprazole    Tablet 40 milliGRAM(s) Oral before breakfast      Allergies    No Known Allergies    Intolerances            Vital Signs Last 24 Hrs  T(C): 37 (13 Jan 2021 05:24), Max: 37 (13 Jan 2021 05:24)  T(F): 98.6 (13 Jan 2021 05:24), Max: 98.6 (13 Jan 2021 05:24)  HR: 70 (13 Jan 2021 05:24) (61 - 90)  BP: 105/61 (13 Jan 2021 05:24) (105/61 - 160/83)  BP(mean): --  RR: 16 (13 Jan 2021 05:24) (14 - 18)  SpO2: 92% (13 Jan 2021 05:24) (92% - 96%)      REVIEW OF SYSTEMS:  Constitutional:  No fever, chills, or night sweats.  Head:  Positive episode of vertigo with movement, better at rest.  Eyes:  No double vision or blurry vision.  Ears:  Hearing loss, right ear.  Neck:  No neck pain.  Cardiovascular:  Positive chest pain which brought her to the emergency room.  Abdomen:  No vomiting but feels nauseous.  Extremities/Neurological:  No numbness or tingling.  Musculoskeletal:  No joint pain.  Genitourinary:  No burning upon urination.    PHYSICAL EXAMINATION:  VITAL SIGNS:  Temperature 97.6, pulse 81, blood pressure 172/84, and respirations 18.  HEENT:  Head:  Normocephalic, atraumatic.  Eyes:  No scleral icterus.  Ears:  Decreased hearing on the right side, not new.  NECK:  Supple.  RESPIRATORY:  Good air entry bilaterally.  CARDIOVASCULAR:  S1 and S2 heard.  ABDOMEN:  Soft and nontender.  EXTREMITIES:  No clubbing or cyanosis were noted.      NEUROLOGIC:  The patient is awake and alert.  Extraocular movements were intact.  No nystagmus.  Upon turning the head side-to-side, the patient has severe vertigo.  Motor:  Bilateral upper and lower were 5/5.  Sensory:  Bilateral upper and lower are intact to light touch.  Gait:  Unable to ambulate the patient secondary to vertigo.              LABS:  CBC Full  -  ( 13 Jan 2021 05:37 )  WBC Count : 6.46 K/uL  RBC Count : 4.79 M/uL  Hemoglobin : 13.6 g/dL  Hematocrit : 40.0 %  Platelet Count - Automated : 231 K/uL  Mean Cell Volume : 83.5 fl  Mean Cell Hemoglobin : 28.4 pg  Mean Cell Hemoglobin Concentration : 34.0 gm/dL  Auto Neutrophil # : x  Auto Lymphocyte # : x  Auto Monocyte # : x  Auto Eosinophil # : x  Auto Basophil # : x  Auto Neutrophil % : x  Auto Lymphocyte % : x  Auto Monocyte % : x  Auto Eosinophil % : x  Auto Basophil % : x      01-13    141  |  104  |  15  ----------------------------<  99  3.5   |  32<H>  |  0.89    Ca    9.2      13 Jan 2021 05:37  Mg     2.2     01-13    TPro  8.2  /  Alb  4.1  /  TBili  0.6  /  DBili  x   /  AST  17  /  ALT  28  /  AlkPhos  74  01-12    Hemoglobin A1C:   Lipid Panel 01-13 @ 10:02  Total Cholesterol, Serum 209  LDL --  Triglycerides 118    LIVER FUNCTIONS - ( 12 Jan 2021 11:56 )  Alb: 4.1 g/dL / Pro: 8.2 g/dL / ALK PHOS: 74 U/L / ALT: 28 U/L / AST: 17 U/L / GGT: x           Vitamin B12         RADIOLOGY      ANALYSIS AND PLAN:  An 85-year-old with an episode of vertigo.  For episode of vertigo, suspect secondary to inner ear dysfunction.  We will plan for an MRI imaging of the brain.  For episode of chest pain, Cardiology and medical workup as needed.  For high blood pressure, continue the patient on blood pressure medications.  For high cholesterol, continue the patient on statin.  For history of aneurysm, I will recommend Neurosurgery evaluation, will need repeat imaging in 3 to 6 months.  Fall precautions.  will increase antivert to 37.5 tid  Advanced care directives were discussed with the family.  Spoke with the daughter-in-law, Kanchan, at [phone], she understands the reasoning and thought process. 1/13/2021    Greater than 45 minutes of time was spent with the patient, plan of care, reviewing data, speaking to the family and  50% of the visit was spent counseling and/or coordinating care with multidisciplinary healthcare team  
[facility] -- Javier Colon, Hilary, Anton, Darin De Los Santos Savella  Office # [phone]      Follow Up:    htn  Subjective/Observations:   No events overnight resting comfortably in bed.  No complaints of chest pain, dyspnea, or palpitations reported. No signs of orthopnea or PND.  denies any further episodes of chest pain, reports "little bit of dizziness"      REVIEW OF SYSTEMS: All other review of systems is negative unless indicated above    PAST MEDICAL & SURGICAL HISTORY:  GERD (gastroesophageal reflux disease)    HLD (hyperlipidemia)    HTN (hypertension)        MEDICATIONS  (STANDING):  aspirin enteric coated 81 milliGRAM(s) Oral daily  atorvastatin 40 milliGRAM(s) Oral at bedtime  enoxaparin Injectable 40 milliGRAM(s) SubCutaneous daily  losartan 50 milliGRAM(s) Oral daily  meclizine 37.5 milliGRAM(s) Oral three times a day  pantoprazole    Tablet 40 milliGRAM(s) Oral before breakfast    MEDICATIONS  (PRN):  ondansetron Injectable 4 milliGRAM(s) IV Push every 8 hours PRN N/V      Allergies    No Known Allergies    Intolerances        Vital Signs Last 24 Hrs  T(C): 36.8 (14 Jan 2021 04:43), Max: 37.2 (13 Jan 2021 13:16)  T(F): 98.2 (14 Jan 2021 04:43), Max: 99 (13 Jan 2021 13:16)  HR: 67 (14 Jan 2021 04:43) (67 - 74)  BP: 117/64 (14 Jan 2021 04:43) (117/64 - 131/68)  BP(mean): --  RR: 17 (14 Jan 2021 04:43) (16 - 17)  SpO2: 92% (14 Jan 2021 04:43) (92% - 92%)    I&O's Summary        PHYSICAL EXAM:  TELE: nsr   Constitutional: NAD, awake and alert, well-developed  HEENT: Moist Mucous Membranes, Anicteric  Pulmonary: Non-labored, breath sounds are clear bilaterally, No wheezing, crackles or rhonchi  Cardiovascular: Regular, S1 and S2 nl, No murmurs, rubs, gallops or clicks  Gastrointestinal: Bowel Sounds present, soft, nontender.   Lymph: No lymphadenopathy. No peripheral edema.  Skin: No visible rashes or ulcers.  Psych:  Mood & affect appropriate    LABS: All Labs Reviewed:                        13.6   6.46  )-----------( 231      ( 13 Jan 2021 05:37 )             40.0                         14.4   5.48  )-----------( 225      ( 12 Jan 2021 11:56 )             42.6     13 Jan 2021 05:37    141    |  104    |  15     ----------------------------<  99     3.5     |  32     |  0.89   12 Jan 2021 11:56    138    |  103    |  17     ----------------------------<  114    5.3     |  30     |  0.87     Ca    9.2        13 Jan 2021 05:37  Ca    9.9        12 Jan 2021 11:56  Mg     2.2       13 Jan 2021 05:37    TPro  8.2    /  Alb  4.1    /  TBili  0.6    /  DBili  x      /  AST  17     /  ALT  28     /  AlkPhos  74     12 Jan 2021 11:56      CARDIAC MARKERS ( 13 Jan 2021 05:37 )  <.015 ng/mL / x     / 47 U/L / x     / x      CARDIAC MARKERS ( 12 Jan 2021 21:34 )  <.015 ng/mL / x     / 55 U/L / x     / x      CARDIAC MARKERS ( 12 Jan 2021 11:56 )  <.015 ng/mL / x     / x     / x     / x        ECG:  < from: 12 Lead ECG (01.13.21 @ 10:14) >    Ventricular Rate 73 BPM    Atrial Rate 73 BPM    P-R Interval 168 ms    QRS Duration 88 ms    Q-T Interval 420 ms    QTC Calculation(Bazett) 462 ms    P Axis 60 degrees    R Axis -38 degrees    T Axis 115 degrees    Diagnosis Line Normal sinus rhythm  Left axis deviation  ST & T wave abnormality, consider anterolateral ischemia  Abnormal ECG  When compared with ECG of 12-JAN-2021 11:16,  T wave inversion more evident in Lateral leads    < end of copied text >    Echo:  < from: TTE Echo Complete w/o Contrast w/ Doppler (01.13.21 @ 09:27) >  OBSERVATIONS:  Mitral Valve: normal, trace physiologic MR.  Aortic Valve/Aorta: Sclerotic trileaflet aortic valve with normal opening. Trace AI  Tricuspid Valve: normal with trace TR.  Pulmonic Valve: Not well-visualized  Left Atrium: normal  Right Atrium: Not well-visualized  Left Ventricle: normal LV size and systolic function, estimated LVEF of 65%.  Right Ventricle: Grossly normal size and systolic function.  Pericardium/Pleura: normal, no significant pericardial effusion.  Pulmonary/RV Pressure: estimated PA systolic pressure of 7 mmHg assuming an RA pressure of 3 mmHg.  LV diastolic dysfunction is present    Conclusion:  Normal left ventricular internal dimensions and systolic function, estimated LVEF of 65%.  Grossly normal RV size and systolic function.  Sclerotic trileaflet aortic valve, trace AI.  Trace physiologic MR and TR.  No significant pericardial effusion.      < end of copied text >    Radiology:        
Date/Time Patient Seen:  		  Referring MD:   Data Reviewed	       Patient is a 85y old  Female who presents with a chief complaint of Chest pain, Dizzy (13 Jan 2021 12:26)      Subjective/HPI     PAST MEDICAL & SURGICAL HISTORY:  GERD (gastroesophageal reflux disease)    HLD (hyperlipidemia)    HTN (hypertension)          Medication list         MEDICATIONS  (STANDING):  aspirin enteric coated 81 milliGRAM(s) Oral daily  atorvastatin 40 milliGRAM(s) Oral at bedtime  enoxaparin Injectable 40 milliGRAM(s) SubCutaneous daily  losartan 50 milliGRAM(s) Oral daily  meclizine 37.5 milliGRAM(s) Oral three times a day  pantoprazole    Tablet 40 milliGRAM(s) Oral before breakfast    MEDICATIONS  (PRN):  ondansetron Injectable 4 milliGRAM(s) IV Push every 8 hours PRN N/V         Vitals log        ICU Vital Signs Last 24 Hrs  T(C): 36.8 (14 Jan 2021 04:43), Max: 37.2 (13 Jan 2021 13:16)  T(F): 98.2 (14 Jan 2021 04:43), Max: 99 (13 Jan 2021 13:16)  HR: 67 (14 Jan 2021 04:43) (67 - 74)  BP: 117/64 (14 Jan 2021 04:43) (117/64 - 131/68)  BP(mean): --  ABP: --  ABP(mean): --  RR: 17 (14 Jan 2021 04:43) (16 - 17)  SpO2: 92% (14 Jan 2021 04:43) (92% - 92%)           Input and Output:  I&O's Detail      Lab Data                        13.6   6.46  )-----------( 231      ( 13 Jan 2021 05:37 )             40.0     01-13    141  |  104  |  15  ----------------------------<  99  3.5   |  32<H>  |  0.89    Ca    9.2      13 Jan 2021 05:37  Mg     2.2     01-13    TPro  8.2  /  Alb  4.1  /  TBili  0.6  /  DBili  x   /  AST  17  /  ALT  28  /  AlkPhos  74  01-12      CARDIAC MARKERS ( 13 Jan 2021 05:37 )  <.015 ng/mL / x     / 47 U/L / x     / x      CARDIAC MARKERS ( 12 Jan 2021 21:34 )  <.015 ng/mL / x     / 55 U/L / x     / x      CARDIAC MARKERS ( 12 Jan 2021 11:56 )  <.015 ng/mL / x     / x     / x     / x            Review of Systems	      Objective     Physical Examination    heart s1s2  lung dec BS  abd soft  on RA    Pertinent Lab findings & Imaging      Bustillo:  NO   Adequate UO     I&O's Detail           Discussed with:     Cultures:	        Radiology                            
Date/Time Patient Seen:  		  Referring MD:   Data Reviewed	       Patient is a 85y old  Female who presents with a chief complaint of Chest pain, Dizzy (12 Jan 2021 18:00)      Subjective/HPI     PAST MEDICAL & SURGICAL HISTORY:  GERD (gastroesophageal reflux disease)    HLD (hyperlipidemia)    HTN (hypertension)          Medication list         MEDICATIONS  (STANDING):  aspirin enteric coated 81 milliGRAM(s) Oral daily  atorvastatin 40 milliGRAM(s) Oral at bedtime  enoxaparin Injectable 40 milliGRAM(s) SubCutaneous daily  losartan 50 milliGRAM(s) Oral daily  meclizine 25 milliGRAM(s) Oral three times a day  pantoprazole    Tablet 40 milliGRAM(s) Oral before breakfast    MEDICATIONS  (PRN):  ondansetron Injectable 4 milliGRAM(s) IV Push every 8 hours PRN N/V         Vitals log        ICU Vital Signs Last 24 Hrs  T(C): 37 (13 Jan 2021 05:24), Max: 37 (13 Jan 2021 05:24)  T(F): 98.6 (13 Jan 2021 05:24), Max: 98.6 (13 Jan 2021 05:24)  HR: 70 (13 Jan 2021 05:24) (61 - 90)  BP: 105/61 (13 Jan 2021 05:24) (105/61 - 172/84)  BP(mean): --  ABP: --  ABP(mean): --  RR: 16 (13 Jan 2021 05:24) (14 - 18)  SpO2: 92% (13 Jan 2021 05:24) (92% - 97%)           Input and Output:  I&O's Detail      Lab Data                        13.6   6.46  )-----------( 231      ( 13 Jan 2021 05:37 )             40.0     01-13    141  |  104  |  15  ----------------------------<  99  3.5   |  32<H>  |  0.89    Ca    9.2      13 Jan 2021 05:37  Mg     2.2     01-13    TPro  8.2  /  Alb  4.1  /  TBili  0.6  /  DBili  x   /  AST  17  /  ALT  28  /  AlkPhos  74  01-12      CARDIAC MARKERS ( 13 Jan 2021 05:37 )  <.015 ng/mL / x     / 47 U/L / x     / x      CARDIAC MARKERS ( 12 Jan 2021 21:34 )  <.015 ng/mL / x     / 55 U/L / x     / x      CARDIAC MARKERS ( 12 Jan 2021 11:56 )  <.015 ng/mL / x     / x     / x     / x            Review of Systems	      Objective     Physical Examination    heart s1s2  lung dec BS  abd soft      Pertinent Lab findings & Imaging      Bustillo:  NO   Adequate UO     I&O's Detail           Discussed with:     Cultures:	        Radiology                            
Patient is a 85y old  Female who presents with a chief complaint of Chest pain, Dizzy (13 Jan 2021 11:42)      INTERVAL HPI/OVERNIGHT EVENTS: Patient seen and examined. NAD. Still dizzy.    Vital Signs Last 24 Hrs  T(C): 37 (13 Jan 2021 05:24), Max: 37 (13 Jan 2021 05:24)  T(F): 98.6 (13 Jan 2021 05:24), Max: 98.6 (13 Jan 2021 05:24)  HR: 70 (13 Jan 2021 05:24) (61 - 90)  BP: 105/61 (13 Jan 2021 05:24) (105/61 - 160/83)  BP(mean): --  RR: 16 (13 Jan 2021 05:24) (14 - 18)  SpO2: 92% (13 Jan 2021 05:24) (92% - 96%)    01-13    141  |  104  |  15  ----------------------------<  99  3.5   |  32<H>  |  0.89    Ca    9.2      13 Jan 2021 05:37  Mg     2.2     01-13    TPro  8.2  /  Alb  4.1  /  TBili  0.6  /  DBili  x   /  AST  17  /  ALT  28  /  AlkPhos  74  01-12                          13.6   6.46  )-----------( 231      ( 13 Jan 2021 05:37 )             40.0       CAPILLARY BLOOD GLUCOSE                  aspirin enteric coated 81 milliGRAM(s) Oral daily  atorvastatin 40 milliGRAM(s) Oral at bedtime  enoxaparin Injectable 40 milliGRAM(s) SubCutaneous daily  losartan 50 milliGRAM(s) Oral daily  meclizine 37.5 milliGRAM(s) Oral three times a day  ondansetron Injectable 4 milliGRAM(s) IV Push every 8 hours PRN  pantoprazole    Tablet 40 milliGRAM(s) Oral before breakfast              REVIEW OF SYSTEMS:  CONSTITUTIONAL: No fever, no weight loss, or no fatigue  NECK: No pain, no stiffness  RESPIRATORY: No cough, no wheezing, no chills, no hemoptysis, No shortness of breath  CARDIOVASCULAR: No chest pain, no palpitations, no dizziness, no leg swelling  GASTROINTESTINAL: No abdominal pain. No nausea, no vomiting, no hematemesis; No diarrhea, no constipation. No melena, no hematochezia.  GENITOURINARY: No dysuria, no frequency, no hematuria, no incontinence  NEUROLOGICAL: No headaches, no loss of strength, no numbness, no tremors;  + dizzy  SKIN: No itching, no burning  MUSCULOSKELETAL: No joint pain, no swelling; No muscle, no back, no extremity pain  PSYCHIATRIC: No depression, no mood swings,   HEME/LYMPH: No easy bruising, no bleeding gums  ALLERY AND IMMUNOLOGIC: No hives       Consultant(s) Notes Reviewed:  [X] YES  [ ] NO    PHYSICAL EXAM:  GENERAL: NAD  HEAD:  Atraumatic, Normocephalic  EYES: EOMI, PERRLA, conjunctiva and sclera clear  ENMT: No tonsillar erythema, exudates, or enlargement; Moist mucous membranes  NECK: Supple, No JVD  NERVOUS SYSTEM:  Awake & alert  CHEST/LUNG: Clear to auscultation bilaterally; No rales, rhonchi, wheezing,  HEART: Regular rate and rhythm  ABDOMEN: Soft, Nontender, Nondistended; Bowel sounds present  EXTREMITIES:  No clubbing, cyanosis, or edema  LYMPH: No lymphadenopathy noted  SKIN: No rashes      Advanced care planning discussed with patient/family [X] YES   [ ] NO    Advanced care planning discussed with patient/family. Patient's health status was discussed. All appropriate changes have been made regarding patient's end-of-life care. Advanced care planning forms reviewed/discussed/completed.  20 minutes spent.   
Patient is a 85y old  Female who presents with a chief complaint of Chest pain, Dizzy (13 Jan 2021 11:42)      INTERVAL HPI/OVERNIGHT EVENTS: Patient seen and examined. NAD. Still dizzy.    Vital Signs Last 24 Hrs  T(C): 36.8 (14 Jan 2021 04:43), Max: 37.2 (13 Jan 2021 13:16)  T(F): 98.2 (14 Jan 2021 04:43), Max: 99 (13 Jan 2021 13:16)  HR: 67 (14 Jan 2021 04:43) (67 - 74)  BP: 117/64 (14 Jan 2021 04:43) (117/64 - 131/68)  BP(mean): --  RR: 17 (14 Jan 2021 04:43) (16 - 17)  SpO2: 92% (14 Jan 2021 04:43) (92% - 92%)    01-13    141  |  104  |  15  ----------------------------<  99  3.5   |  32<H>  |  0.89    Ca    9.2      13 Jan 2021 05:37  Mg     2.2     01-13                            13.6   6.46  )-----------( 231      ( 13 Jan 2021 05:37 )             40.0       CAPILLARY BLOOD GLUCOSE      POCT Blood Glucose.: 114 mg/dL (14 Jan 2021 07:43)              aspirin enteric coated 81 milliGRAM(s) Oral daily  atorvastatin 40 milliGRAM(s) Oral at bedtime  enoxaparin Injectable 40 milliGRAM(s) SubCutaneous daily  losartan 50 milliGRAM(s) Oral daily  meclizine 37.5 milliGRAM(s) Oral three times a day  ondansetron Injectable 4 milliGRAM(s) IV Push every 8 hours PRN  pantoprazole    Tablet 40 milliGRAM(s) Oral before breakfast          REVIEW OF SYSTEMS:  CONSTITUTIONAL: No fever, no weight loss, or no fatigue  NECK: No pain, no stiffness  RESPIRATORY: No cough, no wheezing, no chills, no hemoptysis, No shortness of breath  CARDIOVASCULAR: No chest pain, no palpitations, no dizziness, no leg swelling  GASTROINTESTINAL: No abdominal pain. No nausea, no vomiting, no hematemesis; No diarrhea, no constipation. No melena, no hematochezia.  GENITOURINARY: No dysuria, no frequency, no hematuria, no incontinence  NEUROLOGICAL: No headaches, no loss of strength, no numbness, no tremors;  + dizzy  SKIN: No itching, no burning  MUSCULOSKELETAL: No joint pain, no swelling; No muscle, no back, no extremity pain  PSYCHIATRIC: No depression, no mood swings,   HEME/LYMPH: No easy bruising, no bleeding gums  ALLERY AND IMMUNOLOGIC: No hives       Consultant(s) Notes Reviewed:  [X] YES  [ ] NO    PHYSICAL EXAM:  GENERAL: NAD  HEAD:  Atraumatic, Normocephalic  EYES: EOMI, PERRLA, conjunctiva and sclera clear  ENMT: No tonsillar erythema, exudates, or enlargement; Moist mucous membranes  NECK: Supple, No JVD  NERVOUS SYSTEM:  Awake & alert  CHEST/LUNG: Clear to auscultation bilaterally; No rales, rhonchi, wheezing,  HEART: Regular rate and rhythm  ABDOMEN: Soft, Nontender, Nondistended; Bowel sounds present  EXTREMITIES:  No clubbing, cyanosis, or edema  LYMPH: No lymphadenopathy noted  SKIN: No rashes      Advanced care planning discussed with patient/family [X] YES   [ ] NO    Advanced care planning discussed with patient/family. Patient's health status was discussed. All appropriate changes have been made regarding patient's end-of-life care. Advanced care planning forms reviewed/discussed/completed.  20 minutes spent.

## 2021-01-14 NOTE — PROGRESS NOTE ADULT - ASSESSMENT
85 year old female with history of HTN, HLD, and GERD presents with dizziness, chest pain and intermittent palpitations, found to have vertigo. Cardiology consulted to r/o ACS in the setting of chest pain     Chest Pain  - atypical, x months, tender to touch   - EKG showed SR, TWI V1-V6.   -repeat ekg with diffuse TWI, repeat EKG daily   -ruled out for acs with serial negative enzymes    - echo results as above revealing normal systolic function ef 65% trace MR trace TR  -continue tele , no events overnight   - outpatient stress test when acute issues resolve.   - Continue Lipitor and losartan     Hypertension  - BP: 117/64  - Continue losartan    -check orthostatics, reports dizziness on exam today     Hyperlipidemia  - Continue Lipitor 40 mg HS   - Low fat diet    Vertigo  - Continue Antivert  - Follow Neurology recommendations   - MRI head  no acute abnormality     - Monitor and replete lytes, keep K>4, Mg>2.  - All other medical needs as per primary team.  - Other cardiovascular workup will depend on clinical course.  - Will continue to follow.  Jennifer Pineda FNP-C  Cardiology NP  [facility] [phone]   85 year old female with history of HTN, HLD, and GERD presents with dizziness, chest pain and intermittent palpitations, found to have vertigo. Cardiology consulted to r/o ACS in the setting of chest pain     Chest Pain  - atypical, x months, tender to touch   - EKG showed SR, TWI V1-V6.   -repeat ekg with diffuse TWI, repeat EKG daily , called Dr Bird office in [place] will fax old EKG, as per family patient has hx of abnormal ekg with " flipped"  -ruled out for acs with serial negative enzymes    - echo results as above revealing normal systolic function ef 65% trace MR trace TR  -continue tele , no events overnight   - outpatient stress test when acute issues resolve.   - Continue Lipitor and losartan     Hypertension  - BP: 117/64  - Continue losartan    -check orthostatics, reports dizziness on exam today     Hyperlipidemia  - Continue Lipitor 40 mg HS   - Low fat diet    Vertigo  - Continue Antivert  - Follow Neurology recommendations   - MRI head  no acute abnormality     - Monitor and replete lytes, keep K>4, Mg>2.  - All other medical needs as per primary team.  - Other cardiovascular workup will depend on clinical course.  - Will continue to follow.  Jennifer Pineda FNP-C  Cardiology NP  [facility] [phone]   85 year old female with history of HTN, HLD, and GERD presents with dizziness, chest pain and intermittent palpitations, found to have vertigo. Cardiology consulted to r/o ACS in the setting of chest pain     Chest Pain  - atypical, x months, tender to touch   - EKG showed SR, TWI V1-V6.   -repeat ekg with diffuse TWI, records reviewed in [facility]script- no change from prior EKG 2011   -ruled out for acs with serial negative enzymes    - echo results as above revealing normal systolic function ef 65% trace MR trace TR  -continue tele , no events overnight   - outpatient stress test when acute issues resolve.   - Continue Lipitor and losartan     Hypertension  - BP: 98/68  - hold losartan for now reassess daily    -check orthostatics, reports dizziness on exam today     Hyperlipidemia  - Continue Lipitor 40 mg HS   - Low fat diet    Vertigo  - Continue Antivert  - Follow Neurology recommendations   - MRI head  no acute abnormality     - Monitor and replete lytes, keep K>4, Mg>2.  - All other medical needs as per primary team.  - Other cardiovascular workup will depend on clinical course.  - Will continue to follow.  Jennifer Pineda FNP-C  Cardiology NP  [facility] [phone]   85 year old female with history of HTN, HLD, and GERD presents with dizziness, chest pain and intermittent palpitations, found to have vertigo. Cardiology consulted to r/o ACS in the setting of chest pain     Chest Pain  - atypical, x months, tender to touch   - EKG showed SR, TWI V1-V6.   - repeat ekg with diffuse TWI, records reviewed in [facility]script- no change from prior EKG 2011   - ruled out for acs with serial negative enzymes    - echo results as above revealing normal systolic function ef 65% trace MR trace TR  - continue tele , no events overnight   - outpatient stress test when acute issues resolve.   - Continue Lipitor and losartan     Hypertension  - BP: 98/68  - hold losartan for now reassess daily    -check orthostatics, reports dizziness on exam today     Hyperlipidemia  - Continue Lipitor 40 mg HS   - Low fat diet    Vertigo  - Continue Antivert  - Follow Neurology recommendations   - MRI head  no acute abnormality     - Monitor and replete lytes, keep K>4, Mg>2.  - All other medical needs as per primary team.  - Other cardiovascular workup will depend on clinical course.  - Will continue to follow.  Jennifer Pineda FNP-C  Cardiology NP  [facility] [phone]

## 2021-01-14 NOTE — PROGRESS NOTE ADULT - PROBLEM SELECTOR PLAN 3
CT chest noted -- will need repeat CT chest in 6-12 months  Daughter-in-law states that patient coughs up very thick mucous every morning x 1 month and worries that she might be aspirating (?)  Pulmonary consult noted  SLP evaluation noted  Further work-up/management pending clinical course.

## 2021-01-14 NOTE — PROGRESS NOTE ADULT - PROBLEM SELECTOR PLAN 1
85y old  Female who presents with a chief complaint of dizziness   HPI: 85 year old female with history of HTN, HLD, and GERD presents with dizziness and chest pain  cardio w/u in progress - neuro eval noted -   labs and imaging reviewed  CNS imaging noted - MRI noted -   CXR and CT chest reviewed - pulm nodule - GGO - Sub Cm - non smoker  repeat study in 12 months as indicated - non emergent  no resp distress - on RA - VS noted  cvs rx regimen and BP control - monitor VS and HD and Sat  TTE planned  PT eval planned.
Increase antivert  MRI brain no acute pathology  PT  Neuro f/u   Further work-up/management pending clinical course.
SLP eval noted - Dysphagia Diet rec  85y old  Female who presents with a chief complaint of dizziness   HPI: 85 year old female with history of HTN, HLD, and GERD presents with dizziness and chest pain  cardio w/u in progress - neuro eval noted -   labs and imaging reviewed  CNS imaging noted - MRI noted -   CXR and CT chest reviewed - pulm nodule - GGO - Sub Cm - non smoker  repeat study in 12 months as indicated - non emergent  no resp distress - on RA - VS noted  cvs rx regimen and BP control - monitor VS and HD and Sat  TTE   PT eval
Slowly improving  ontinue antivert  MRI brain no acute pathology  PT  Neuro f/u

## 2021-01-14 NOTE — PROGRESS NOTE ADULT - ATTENDING COMMENTS
Chart reviewed    Patient seen and examined    Agree with plan as outlined above
If EKG changes are not new, then d/c planning home
Seen/examined. agree with above.

## 2021-01-21 PROCEDURE — 71045 X-RAY EXAM CHEST 1 VIEW: CPT

## 2021-01-21 PROCEDURE — 70450 CT HEAD/BRAIN W/O DYE: CPT

## 2021-01-21 PROCEDURE — 93306 TTE W/DOPPLER COMPLETE: CPT

## 2021-01-21 PROCEDURE — 71250 CT THORAX DX C-: CPT

## 2021-01-21 PROCEDURE — 70551 MRI BRAIN STEM W/O DYE: CPT

## 2021-01-21 PROCEDURE — 83735 ASSAY OF MAGNESIUM: CPT

## 2021-01-21 PROCEDURE — 92610 EVALUATE SWALLOWING FUNCTION: CPT

## 2021-01-21 PROCEDURE — 84484 ASSAY OF TROPONIN QUANT: CPT

## 2021-01-21 PROCEDURE — 80048 BASIC METABOLIC PNL TOTAL CA: CPT

## 2021-01-21 PROCEDURE — 92611 MOTION FLUOROSCOPY/SWALLOW: CPT

## 2021-01-21 PROCEDURE — U0005: CPT

## 2021-01-21 PROCEDURE — A9698: CPT

## 2021-01-21 PROCEDURE — 82962 GLUCOSE BLOOD TEST: CPT

## 2021-01-21 PROCEDURE — 85027 COMPLETE CBC AUTOMATED: CPT

## 2021-01-21 PROCEDURE — 74230 X-RAY XM SWLNG FUNCJ C+: CPT

## 2021-01-21 PROCEDURE — 84145 PROCALCITONIN (PCT): CPT

## 2021-01-21 PROCEDURE — U0003: CPT

## 2021-01-21 PROCEDURE — 36415 COLL VENOUS BLD VENIPUNCTURE: CPT

## 2021-01-21 PROCEDURE — 82550 ASSAY OF CK (CPK): CPT

## 2021-01-21 PROCEDURE — 80061 LIPID PANEL: CPT

## 2021-01-21 PROCEDURE — 70498 CT ANGIOGRAPHY NECK: CPT

## 2021-01-21 PROCEDURE — 96374 THER/PROPH/DIAG INJ IV PUSH: CPT

## 2021-01-21 PROCEDURE — 97161 PT EVAL LOW COMPLEX 20 MIN: CPT

## 2021-01-21 PROCEDURE — 70496 CT ANGIOGRAPHY HEAD: CPT

## 2021-01-21 PROCEDURE — 80053 COMPREHEN METABOLIC PANEL: CPT

## 2021-01-21 PROCEDURE — 97530 THERAPEUTIC ACTIVITIES: CPT

## 2021-01-21 PROCEDURE — 99285 EMERGENCY DEPT VISIT HI MDM: CPT | Mod: 25

## 2021-01-21 PROCEDURE — 86769 SARS-COV-2 COVID-19 ANTIBODY: CPT

## 2021-01-21 PROCEDURE — 93005 ELECTROCARDIOGRAM TRACING: CPT

## 2021-01-21 PROCEDURE — 83036 HEMOGLOBIN GLYCOSYLATED A1C: CPT

## 2021-01-21 PROCEDURE — 85025 COMPLETE CBC W/AUTO DIFF WBC: CPT

## 2021-01-21 PROCEDURE — 97116 GAIT TRAINING THERAPY: CPT

## 2023-05-30 ENCOUNTER — APPOINTMENT (OUTPATIENT)
Dept: OTOLARYNGOLOGY | Facility: CLINIC | Age: 88
End: 2023-05-30

## 2023-08-22 ENCOUNTER — APPOINTMENT (OUTPATIENT)
Dept: OTOLARYNGOLOGY | Facility: CLINIC | Age: 88
End: 2023-08-22
Payer: MEDICARE

## 2023-08-22 ENCOUNTER — NON-APPOINTMENT (OUTPATIENT)
Age: 88
End: 2023-08-22

## 2023-08-22 VITALS
HEART RATE: 67 BPM | BODY MASS INDEX: 21.9 KG/M2 | HEIGHT: 61 IN | SYSTOLIC BLOOD PRESSURE: 128 MMHG | WEIGHT: 116 LBS | DIASTOLIC BLOOD PRESSURE: 71 MMHG

## 2023-08-22 DIAGNOSIS — H70.11 CHRONIC MASTOIDITIS, RIGHT EAR: ICD-10-CM

## 2023-08-22 DIAGNOSIS — J34.2 DEVIATED NASAL SEPTUM: ICD-10-CM

## 2023-08-22 DIAGNOSIS — Z86.59 PERSONAL HISTORY OF OTHER MENTAL AND BEHAVIORAL DISORDERS: ICD-10-CM

## 2023-08-22 DIAGNOSIS — H81.01 MENIERE'S DISEASE, RIGHT EAR: ICD-10-CM

## 2023-08-22 DIAGNOSIS — Z78.9 OTHER SPECIFIED HEALTH STATUS: ICD-10-CM

## 2023-08-22 DIAGNOSIS — H91.8X9 OTHER SPECIFIED HEARING LOSS, UNSPECIFIED EAR: ICD-10-CM

## 2023-08-22 DIAGNOSIS — Z82.49 FAMILY HISTORY OF ISCHEMIC HEART DISEASE AND OTHER DISEASES OF THE CIRCULATORY SYSTEM: ICD-10-CM

## 2023-08-22 DIAGNOSIS — J31.0 CHRONIC RHINITIS: ICD-10-CM

## 2023-08-22 PROCEDURE — 92567 TYMPANOMETRY: CPT

## 2023-08-22 PROCEDURE — 92557 COMPREHENSIVE HEARING TEST: CPT

## 2023-08-22 PROCEDURE — 69220 CLEAN OUT MASTOID CAVITY: CPT | Mod: RT

## 2023-08-22 PROCEDURE — 99203 OFFICE O/P NEW LOW 30 MIN: CPT | Mod: 25

## 2023-08-22 NOTE — HISTORY OF PRESENT ILLNESS
[de-identified] : Ms. Figueroa is an 87 year old lady who presents today for loss of hearing in the left ear. She previously had mastoid surgery on the right ear approximately 20 years ago. She had dizziness previously and was diagnosed with Meniere's disease. She lost hearing in the right ear and presents today because she has also lost hearing in the left ear. She is developing anxiety for not being able to hear. She did not have her ear cleaned after the surgery.

## 2023-08-22 NOTE — PHYSICAL EXAM
[Midline] : trachea located in midline position [Normal] : orientation to person, place, and time: normal [Hearing Loss Right Only] : normal [Hearing Loss Left Only] : normal [FreeTextEntry8] : cerumen removed via curettage mastoid cleaning [FreeTextEntry5] : no response to tuning forks [de-identified] : deviated septum inflamed turbinates

## 2023-08-22 NOTE — ASSESSMENT
[FreeTextEntry1] : Reviewed and Reconciled the pmhx, fmhx, sochx, and medhx Ms. Figueroa is an 87 year old lady who presents today for loss of hearing in the left ear. She previously had mastoid surgery on the right ear approximately 20 years ago. She had dizziness previously and was diagnosed with Meniere's disease. She lost hearing in the right ear and presents today because she has also lost hearing in the left ear. She is developing anxiety for not being able to hear. She did not have her ear cleaned after the surgery.   Physical Exam: cerumen impaction removed via curettage right mastoid cleaning no response to tunning forks deviated septum  inflamed turbinates   Procedure: Right Mastoid Cavity cleaning. Under direct visualization using a combinationof curettage, suction and foreign body forceps the mastoid cavity is cleaned of debris, cerumen, epithelial tissue, and dead skin.  -TYMPS:TYPE A AU  -AD: PROFOUND SNHL 250-8000 HZ (NR AT OUTPUT OF AUDIOMETER ACROSS FREQ RANGE) -AS: MILD SLOPING TO MOD-SEVERE SNHL 250-8000 HZ  TPP 15 RIGHT TPP -10 LEFT  Plan: Audio- results interpreted by Dr. Bailey and reviewed with the patient. hearing aid suggested for left ear or cross aid. Hearing aid consultation recommended. FU PRN

## 2023-08-22 NOTE — ADDENDUM
[FreeTextEntry1] : Documented by Abbey Ledezma acting as a scribe for Dr. Bailey on [mm//dd/yyyy].   All Medical record entries made by the scribe were at my, Dr. Bailey, direction and personally directed by me on 08/22/2023. I have reviewed the chart and agree that the record accurately reflects my personal performance of the history, physical exam, assessment, and plan. I have also personally directed, reviewed, and agreed with the discharge instructions.

## 2023-08-22 NOTE — DATA REVIEWED
[de-identified] : -TYMPS:TYPE A AU  -AD: PROFOUND SNHL 250-8000 HZ (NR AT OUTPUT OF AUDIOMETER ACROSS FREQ RANGE) -AS: MILD SLOPING TO MOD-SEVERE SNHL 250-8000 HZ

## 2023-08-22 NOTE — PROCEDURE
[FreeTextEntry1] : Mastoid Cleaning [FreeTextEntry2] : cerumen  [FreeTextEntry3] : Procedure: Right Mastoid Cavity cleaning. Under direct visualization using a combinationof curettage, suction and foreign body forceps the mastoid cavity is cleaned of debris, cerumen, epithelial tissue, and dead skin.

## 2023-08-22 NOTE — CONSULT LETTER
[Dear  ___] : Dear  [unfilled], [Consult Letter:] : I had the pleasure of evaluating your patient, [unfilled]. [Please see my note below.] : Please see my note below. [Consult Closing:] : Thank you very much for allowing me to participate in the care of this patient.  If you have any questions, please do not hesitate to contact me. [Sincerely,] : Sincerely, [FreeTextEntry3] : Kevin Bailey MD FACS

## 2023-09-14 ENCOUNTER — APPOINTMENT (OUTPATIENT)
Dept: PHARMACY | Facility: CLINIC | Age: 88
End: 2023-09-14
Payer: SELF-PAY

## 2023-09-14 PROCEDURE — V5010 ASSESSMENT FOR HEARING AID: CPT | Mod: NC

## 2023-10-02 RX ORDER — OMEPRAZOLE 10 MG/1
0 CAPSULE, DELAYED RELEASE ORAL
Qty: 0 | Refills: 0 | DISCHARGE

## 2023-10-02 RX ORDER — ATORVASTATIN CALCIUM 80 MG/1
1 TABLET, FILM COATED ORAL
Qty: 0 | Refills: 0 | DISCHARGE

## 2023-10-02 RX ORDER — MELOXICAM 15 MG/1
0 TABLET ORAL
Qty: 0 | Refills: 0 | DISCHARGE

## 2023-10-02 RX ORDER — LOSARTAN POTASSIUM 100 MG/1
0 TABLET, FILM COATED ORAL
Qty: 0 | Refills: 0 | DISCHARGE

## 2023-10-02 RX ORDER — CHLORTHALIDONE 50 MG
0 TABLET ORAL
Qty: 0 | Refills: 0 | DISCHARGE